# Patient Record
Sex: FEMALE | Race: WHITE | NOT HISPANIC OR LATINO | Employment: OTHER | URBAN - METROPOLITAN AREA
[De-identification: names, ages, dates, MRNs, and addresses within clinical notes are randomized per-mention and may not be internally consistent; named-entity substitution may affect disease eponyms.]

---

## 2018-08-13 ENCOUNTER — HOSPITAL ENCOUNTER (OUTPATIENT)
Facility: MEDICAL CENTER | Age: 78
End: 2018-08-13
Attending: NEUROLOGICAL SURGERY | Admitting: NEUROLOGICAL SURGERY
Payer: MEDICARE

## 2018-08-15 ENCOUNTER — HOSPITAL ENCOUNTER (OUTPATIENT)
Dept: RADIOLOGY | Facility: MEDICAL CENTER | Age: 78
End: 2018-08-15
Attending: NEUROLOGICAL SURGERY | Admitting: NEUROLOGICAL SURGERY
Payer: MEDICARE

## 2018-08-15 VITALS — HEIGHT: 66 IN | BODY MASS INDEX: 21.65 KG/M2 | WEIGHT: 134.7 LBS

## 2018-08-15 DIAGNOSIS — M48.02 SPINAL STENOSIS IN CERVICAL REGION: ICD-10-CM

## 2018-08-15 DIAGNOSIS — Z01.812 PRE-OPERATIVE LABORATORY EXAMINATION: ICD-10-CM

## 2018-08-15 DIAGNOSIS — M50.30 DEGENERATION OF CERVICAL INTERVERTEBRAL DISC: ICD-10-CM

## 2018-08-15 DIAGNOSIS — Z01.810 PRE-OPERATIVE CARDIOVASCULAR EXAMINATION: ICD-10-CM

## 2018-08-15 LAB
ANION GAP SERPL CALC-SCNC: 12 MMOL/L (ref 0–11.9)
APPEARANCE UR: CLEAR
APTT PPP: 26.6 SEC (ref 24.7–36)
BASOPHILS # BLD AUTO: 0.4 % (ref 0–1.8)
BASOPHILS # BLD: 0.04 K/UL (ref 0–0.12)
BILIRUB UR QL STRIP.AUTO: NEGATIVE
BUN SERPL-MCNC: 13 MG/DL (ref 8–22)
CALCIUM SERPL-MCNC: 9.5 MG/DL (ref 8.5–10.5)
CHLORIDE SERPL-SCNC: 104 MMOL/L (ref 96–112)
CO2 SERPL-SCNC: 26 MMOL/L (ref 20–33)
COLOR UR: YELLOW
CREAT SERPL-MCNC: 0.73 MG/DL (ref 0.5–1.4)
EKG IMPRESSION: NORMAL
EOSINOPHIL # BLD AUTO: 0.12 K/UL (ref 0–0.51)
EOSINOPHIL NFR BLD: 1.1 % (ref 0–6.9)
ERYTHROCYTE [DISTWIDTH] IN BLOOD BY AUTOMATED COUNT: 54 FL (ref 35.9–50)
GLUCOSE SERPL-MCNC: 82 MG/DL (ref 65–99)
GLUCOSE UR STRIP.AUTO-MCNC: NEGATIVE MG/DL
HCT VFR BLD AUTO: 43.6 % (ref 37–47)
HGB BLD-MCNC: 13.8 G/DL (ref 12–16)
IMM GRANULOCYTES # BLD AUTO: 0.04 K/UL (ref 0–0.11)
IMM GRANULOCYTES NFR BLD AUTO: 0.4 % (ref 0–0.9)
INR PPP: 0.93 (ref 0.87–1.13)
KETONES UR STRIP.AUTO-MCNC: 40 MG/DL
LEUKOCYTE ESTERASE UR QL STRIP.AUTO: NEGATIVE
LYMPHOCYTES # BLD AUTO: 2.05 K/UL (ref 1–4.8)
LYMPHOCYTES NFR BLD: 19.1 % (ref 22–41)
MCH RBC QN AUTO: 32.6 PG (ref 27–33)
MCHC RBC AUTO-ENTMCNC: 31.7 G/DL (ref 33.6–35)
MCV RBC AUTO: 103.1 FL (ref 81.4–97.8)
MICRO URNS: ABNORMAL
MONOCYTES # BLD AUTO: 0.66 K/UL (ref 0–0.85)
MONOCYTES NFR BLD AUTO: 6.1 % (ref 0–13.4)
NEUTROPHILS # BLD AUTO: 7.84 K/UL (ref 2–7.15)
NEUTROPHILS NFR BLD: 72.9 % (ref 44–72)
NITRITE UR QL STRIP.AUTO: NEGATIVE
NRBC # BLD AUTO: 0 K/UL
NRBC BLD-RTO: 0 /100 WBC
PH UR STRIP.AUTO: 7.5 [PH]
PLATELET # BLD AUTO: 254 K/UL (ref 164–446)
PMV BLD AUTO: 11.2 FL (ref 9–12.9)
POTASSIUM SERPL-SCNC: 4.2 MMOL/L (ref 3.6–5.5)
PROT UR QL STRIP: NEGATIVE MG/DL
PROTHROMBIN TIME: 12.2 SEC (ref 12–14.6)
RBC # BLD AUTO: 4.23 M/UL (ref 4.2–5.4)
RBC UR QL AUTO: NEGATIVE
SODIUM SERPL-SCNC: 142 MMOL/L (ref 135–145)
SP GR UR STRIP.AUTO: 1.02
UROBILINOGEN UR STRIP.AUTO-MCNC: 1 MG/DL
WBC # BLD AUTO: 10.8 K/UL (ref 4.8–10.8)

## 2018-08-15 PROCEDURE — 72050 X-RAY EXAM NECK SPINE 4/5VWS: CPT

## 2018-08-15 PROCEDURE — 36415 COLL VENOUS BLD VENIPUNCTURE: CPT

## 2018-08-15 PROCEDURE — 71046 X-RAY EXAM CHEST 2 VIEWS: CPT

## 2018-08-15 PROCEDURE — 85025 COMPLETE CBC W/AUTO DIFF WBC: CPT

## 2018-08-15 PROCEDURE — 93010 ELECTROCARDIOGRAM REPORT: CPT | Performed by: INTERNAL MEDICINE

## 2018-08-15 PROCEDURE — 85610 PROTHROMBIN TIME: CPT

## 2018-08-15 PROCEDURE — 80048 BASIC METABOLIC PNL TOTAL CA: CPT

## 2018-08-15 PROCEDURE — 85730 THROMBOPLASTIN TIME PARTIAL: CPT

## 2018-08-15 PROCEDURE — 93005 ELECTROCARDIOGRAM TRACING: CPT

## 2018-08-15 PROCEDURE — 81003 URINALYSIS AUTO W/O SCOPE: CPT

## 2018-08-15 RX ORDER — HYDROCODONE BITARTRATE AND ACETAMINOPHEN 7.5; 325 MG/1; MG/1
1-2 TABLET ORAL EVERY 8 HOURS PRN
COMMUNITY
End: 2018-08-17 | Stop reason: HOSPADM

## 2023-11-29 ENCOUNTER — APPOINTMENT (OUTPATIENT)
Dept: RADIOLOGY | Facility: MEDICAL CENTER | Age: 83
DRG: 189 | End: 2023-11-29
Payer: OTHER MISCELLANEOUS

## 2023-11-29 ENCOUNTER — HOSPITAL ENCOUNTER (INPATIENT)
Facility: MEDICAL CENTER | Age: 83
LOS: 7 days | DRG: 189 | End: 2023-12-06
Attending: EMERGENCY MEDICINE | Admitting: INTERNAL MEDICINE
Payer: OTHER MISCELLANEOUS

## 2023-11-29 ENCOUNTER — APPOINTMENT (OUTPATIENT)
Dept: RADIOLOGY | Facility: MEDICAL CENTER | Age: 83
DRG: 189 | End: 2023-11-29
Attending: EMERGENCY MEDICINE
Payer: OTHER MISCELLANEOUS

## 2023-11-29 DIAGNOSIS — S20.219A CONTUSION OF CHEST WALL, UNSPECIFIED LATERALITY, INITIAL ENCOUNTER: ICD-10-CM

## 2023-11-29 DIAGNOSIS — R09.02 HYPOXIA: ICD-10-CM
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
## 2023-11-29 DIAGNOSIS — J96.21 ACUTE ON CHRONIC RESPIRATORY FAILURE WITH HYPOXIA (HCC): ICD-10-CM

## 2023-11-29 DIAGNOSIS — J18.9 PNEUMONIA DUE TO INFECTIOUS ORGANISM, UNSPECIFIED LATERALITY, UNSPECIFIED PART OF LUNG: ICD-10-CM

## 2023-11-29 PROBLEM — J96.20 ACUTE ON CHRONIC RESPIRATORY FAILURE (HCC): Status: ACTIVE | Noted: 2023-11-29

## 2023-11-29 PROBLEM — J44.9 COPD (CHRONIC OBSTRUCTIVE PULMONARY DISEASE) (HCC): Status: ACTIVE | Noted: 2023-11-29

## 2023-11-29 PROBLEM — D72.829 LEUKOCYTOSIS: Status: ACTIVE | Noted: 2023-11-29

## 2023-11-29 PROBLEM — Z66 DNR (DO NOT RESUSCITATE): Status: ACTIVE | Noted: 2023-11-29

## 2023-11-29 PROBLEM — Z72.0 TOBACCO ABUSE: Status: ACTIVE | Noted: 2023-11-29

## 2023-11-29 PROBLEM — J15.9 COMMUNITY ACQUIRED BACTERIAL PNEUMONIA: Status: ACTIVE | Noted: 2023-11-29

## 2023-11-29 LAB
ABO + RH BLD: NORMAL
ABO GROUP BLD: NORMAL
ALBUMIN SERPL BCP-MCNC: 3.6 G/DL (ref 3.2–4.9)
ALBUMIN/GLOB SERPL: 1.2 G/DL
ALP SERPL-CCNC: 88 U/L (ref 30–99)
ALT SERPL-CCNC: 13 U/L (ref 2–50)
ANION GAP SERPL CALC-SCNC: 8 MMOL/L (ref 7–16)
APTT PPP: 30.3 SEC (ref 24.7–36)
AST SERPL-CCNC: 17 U/L (ref 12–45)
BASOPHILS # BLD AUTO: 0.4 % (ref 0–1.8)
BASOPHILS # BLD: 0.04 K/UL (ref 0–0.12)
BILIRUB SERPL-MCNC: 0.2 MG/DL (ref 0.1–1.5)
BLD GP AB SCN SERPL QL: NORMAL
BUN SERPL-MCNC: 17 MG/DL (ref 8–22)
CALCIUM ALBUM COR SERPL-MCNC: 8.9 MG/DL (ref 8.5–10.5)
CALCIUM SERPL-MCNC: 8.6 MG/DL (ref 8.5–10.5)
CHLORIDE SERPL-SCNC: 104 MMOL/L (ref 96–112)
CO2 SERPL-SCNC: 27 MMOL/L (ref 20–33)
CREAT SERPL-MCNC: 0.79 MG/DL (ref 0.5–1.4)
EOSINOPHIL # BLD AUTO: 0.12 K/UL (ref 0–0.51)
EOSINOPHIL NFR BLD: 1.1 % (ref 0–6.9)
ERYTHROCYTE [DISTWIDTH] IN BLOOD BY AUTOMATED COUNT: 52.4 FL (ref 35.9–50)
ETHANOL BLD-MCNC: <10.1 MG/DL
FLUAV RNA SPEC QL NAA+PROBE: NEGATIVE
FLUBV RNA SPEC QL NAA+PROBE: NEGATIVE
GFR SERPLBLD CREATININE-BSD FMLA CKD-EPI: 74 ML/MIN/1.73 M 2
GLOBULIN SER CALC-MCNC: 2.9 G/DL (ref 1.9–3.5)
GLUCOSE SERPL-MCNC: 69 MG/DL (ref 65–99)
HCG SERPL QL: NEGATIVE
HCT VFR BLD AUTO: 43.9 % (ref 37–47)
HGB BLD-MCNC: 14.2 G/DL (ref 12–16)
IMM GRANULOCYTES # BLD AUTO: 0.05 K/UL (ref 0–0.11)
IMM GRANULOCYTES NFR BLD AUTO: 0.5 % (ref 0–0.9)
INR PPP: 0.98 (ref 0.87–1.13)
LACTATE SERPL-SCNC: 1 MMOL/L (ref 0.5–2)
LYMPHOCYTES # BLD AUTO: 2.95 K/UL (ref 1–4.8)
LYMPHOCYTES NFR BLD: 27.1 % (ref 22–41)
MCH RBC QN AUTO: 31.6 PG (ref 27–33)
MCHC RBC AUTO-ENTMCNC: 32.3 G/DL (ref 32.2–35.5)
MCV RBC AUTO: 97.8 FL (ref 81.4–97.8)
MONOCYTES # BLD AUTO: 0.7 K/UL (ref 0–0.85)
MONOCYTES NFR BLD AUTO: 6.4 % (ref 0–13.4)
NEUTROPHILS # BLD AUTO: 7.01 K/UL (ref 1.82–7.42)
NEUTROPHILS NFR BLD: 64.5 % (ref 44–72)
NRBC # BLD AUTO: 0 K/UL
NRBC BLD-RTO: 0 /100 WBC (ref 0–0.2)
PLATELET # BLD AUTO: 205 K/UL (ref 164–446)
PMV BLD AUTO: 11.5 FL (ref 9–12.9)
POTASSIUM SERPL-SCNC: 4.8 MMOL/L (ref 3.6–5.5)
PROT SERPL-MCNC: 6.5 G/DL (ref 6–8.2)
PROTHROMBIN TIME: 13.1 SEC (ref 12–14.6)
RBC # BLD AUTO: 4.49 M/UL (ref 4.2–5.4)
RH BLD: NORMAL
RSV RNA SPEC QL NAA+PROBE: NEGATIVE
SARS-COV-2 RNA RESP QL NAA+PROBE: NOTDETECTED
SODIUM SERPL-SCNC: 139 MMOL/L (ref 135–145)
SPECIMEN SOURCE: NORMAL
WBC # BLD AUTO: 10.9 K/UL (ref 4.8–10.8)

## 2023-11-29 PROCEDURE — 700111 HCHG RX REV CODE 636 W/ 250 OVERRIDE (IP): Mod: JZ | Performed by: EMERGENCY MEDICINE

## 2023-11-29 PROCEDURE — 0241U HCHG SARS-COV-2 COVID-19 NFCT DS RESP RNA 4 TRGT MIC: CPT

## 2023-11-29 PROCEDURE — 72131 CT LUMBAR SPINE W/O DYE: CPT

## 2023-11-29 PROCEDURE — 72125 CT NECK SPINE W/O DYE: CPT

## 2023-11-29 PROCEDURE — 96365 THER/PROPH/DIAG IV INF INIT: CPT

## 2023-11-29 PROCEDURE — A9270 NON-COVERED ITEM OR SERVICE: HCPCS | Performed by: INTERNAL MEDICINE

## 2023-11-29 PROCEDURE — 305948 HCHG GREEN TRAUMA ACT PRE-NOTIFY NO CC

## 2023-11-29 PROCEDURE — 99222 1ST HOSP IP/OBS MODERATE 55: CPT | Performed by: SURGERY

## 2023-11-29 PROCEDURE — 96375 TX/PRO/DX INJ NEW DRUG ADDON: CPT

## 2023-11-29 PROCEDURE — 84703 CHORIONIC GONADOTROPIN ASSAY: CPT

## 2023-11-29 PROCEDURE — 71260 CT THORAX DX C+: CPT

## 2023-11-29 PROCEDURE — 99223 1ST HOSP IP/OBS HIGH 75: CPT | Mod: 25,AI | Performed by: INTERNAL MEDICINE

## 2023-11-29 PROCEDURE — 73590 X-RAY EXAM OF LOWER LEG: CPT | Mod: LT

## 2023-11-29 PROCEDURE — 36415 COLL VENOUS BLD VENIPUNCTURE: CPT

## 2023-11-29 PROCEDURE — 85025 COMPLETE CBC W/AUTO DIFF WBC: CPT

## 2023-11-29 PROCEDURE — 700102 HCHG RX REV CODE 250 W/ 637 OVERRIDE(OP): Performed by: INTERNAL MEDICINE

## 2023-11-29 PROCEDURE — 83605 ASSAY OF LACTIC ACID: CPT

## 2023-11-29 PROCEDURE — 700101 HCHG RX REV CODE 250: Performed by: SURGERY

## 2023-11-29 PROCEDURE — 86850 RBC ANTIBODY SCREEN: CPT

## 2023-11-29 PROCEDURE — 85730 THROMBOPLASTIN TIME PARTIAL: CPT

## 2023-11-29 PROCEDURE — 85610 PROTHROMBIN TIME: CPT

## 2023-11-29 PROCEDURE — A9270 NON-COVERED ITEM OR SERVICE: HCPCS | Performed by: SURGERY

## 2023-11-29 PROCEDURE — 700117 HCHG RX CONTRAST REV CODE 255: Performed by: EMERGENCY MEDICINE

## 2023-11-29 PROCEDURE — 86901 BLOOD TYPING SEROLOGIC RH(D): CPT

## 2023-11-29 PROCEDURE — 700105 HCHG RX REV CODE 258: Performed by: INTERNAL MEDICINE

## 2023-11-29 PROCEDURE — 72128 CT CHEST SPINE W/O DYE: CPT

## 2023-11-29 PROCEDURE — 71045 X-RAY EXAM CHEST 1 VIEW: CPT

## 2023-11-29 PROCEDURE — 86900 BLOOD TYPING SEROLOGIC ABO: CPT

## 2023-11-29 PROCEDURE — 99406 BEHAV CHNG SMOKING 3-10 MIN: CPT | Performed by: INTERNAL MEDICINE

## 2023-11-29 PROCEDURE — C9803 HOPD COVID-19 SPEC COLLECT: HCPCS | Performed by: INTERNAL MEDICINE

## 2023-11-29 PROCEDURE — 700111 HCHG RX REV CODE 636 W/ 250 OVERRIDE (IP): Performed by: INTERNAL MEDICINE

## 2023-11-29 PROCEDURE — 99406 BEHAV CHNG SMOKING 3-10 MIN: CPT

## 2023-11-29 PROCEDURE — 70450 CT HEAD/BRAIN W/O DYE: CPT

## 2023-11-29 PROCEDURE — 99285 EMERGENCY DEPT VISIT HI MDM: CPT

## 2023-11-29 PROCEDURE — 80053 COMPREHEN METABOLIC PANEL: CPT

## 2023-11-29 PROCEDURE — 770006 HCHG ROOM/CARE - MED/SURG/GYN SEMI*

## 2023-11-29 PROCEDURE — 700102 HCHG RX REV CODE 250 W/ 637 OVERRIDE(OP): Performed by: EMERGENCY MEDICINE

## 2023-11-29 PROCEDURE — 87040 BLOOD CULTURE FOR BACTERIA: CPT | Mod: 91

## 2023-11-29 PROCEDURE — 700102 HCHG RX REV CODE 250 W/ 637 OVERRIDE(OP): Performed by: SURGERY

## 2023-11-29 PROCEDURE — 82077 ASSAY SPEC XCP UR&BREATH IA: CPT

## 2023-11-29 PROCEDURE — A9270 NON-COVERED ITEM OR SERVICE: HCPCS | Performed by: EMERGENCY MEDICINE

## 2023-11-29 PROCEDURE — 72170 X-RAY EXAM OF PELVIS: CPT

## 2023-11-29 PROCEDURE — 700105 HCHG RX REV CODE 258: Performed by: EMERGENCY MEDICINE

## 2023-11-29 RX ORDER — ZOLPIDEM TARTRATE 5 MG/1
5 TABLET ORAL NIGHTLY PRN
Status: DISCONTINUED | OUTPATIENT
Start: 2023-11-29 | End: 2023-12-06 | Stop reason: HOSPADM

## 2023-11-29 RX ORDER — CELECOXIB 100 MG/1
100 CAPSULE ORAL DAILY
Status: DISCONTINUED | OUTPATIENT
Start: 2023-11-29 | End: 2023-11-29

## 2023-11-29 RX ORDER — AZITHROMYCIN 250 MG/1
500 TABLET, FILM COATED ORAL ONCE
Status: COMPLETED | OUTPATIENT
Start: 2023-11-29 | End: 2023-11-29

## 2023-11-29 RX ORDER — HYDROCODONE BITARTRATE AND ACETAMINOPHEN 5; 325 MG/1; MG/1
1 TABLET ORAL EVERY 4 HOURS PRN
Status: ON HOLD | COMMUNITY
End: 2023-12-01

## 2023-11-29 RX ORDER — PREGABALIN 100 MG/1
100 CAPSULE ORAL EVERY 12 HOURS
Status: DISCONTINUED | OUTPATIENT
Start: 2023-11-29 | End: 2023-12-06 | Stop reason: HOSPADM

## 2023-11-29 RX ORDER — OXYCODONE HYDROCHLORIDE 5 MG/1
5 TABLET ORAL
Status: DISCONTINUED | OUTPATIENT
Start: 2023-11-29 | End: 2023-12-06 | Stop reason: HOSPADM

## 2023-11-29 RX ORDER — CELECOXIB 100 MG/1
100 CAPSULE ORAL DAILY
Status: COMPLETED | OUTPATIENT
Start: 2023-11-29 | End: 2023-12-05

## 2023-11-29 RX ORDER — HYDROMORPHONE HYDROCHLORIDE 1 MG/ML
0.5 INJECTION, SOLUTION INTRAMUSCULAR; INTRAVENOUS; SUBCUTANEOUS
Status: DISCONTINUED | OUTPATIENT
Start: 2023-11-29 | End: 2023-12-06 | Stop reason: HOSPADM

## 2023-11-29 RX ORDER — ACETAMINOPHEN 325 MG/1
650 TABLET ORAL EVERY 6 HOURS PRN
Status: DISCONTINUED | OUTPATIENT
Start: 2023-11-29 | End: 2023-11-29

## 2023-11-29 RX ORDER — ENOXAPARIN SODIUM 100 MG/ML
40 INJECTION SUBCUTANEOUS DAILY
Status: DISCONTINUED | OUTPATIENT
Start: 2023-11-29 | End: 2023-12-06 | Stop reason: HOSPADM

## 2023-11-29 RX ORDER — IPRATROPIUM BROMIDE AND ALBUTEROL SULFATE 2.5; .5 MG/3ML; MG/3ML
3 SOLUTION RESPIRATORY (INHALATION)
Status: DISCONTINUED | OUTPATIENT
Start: 2023-11-29 | End: 2023-12-06 | Stop reason: HOSPADM

## 2023-11-29 RX ORDER — LEVOTHYROXINE SODIUM 0.05 MG/1
50 TABLET ORAL
COMMUNITY

## 2023-11-29 RX ORDER — OXYCODONE HYDROCHLORIDE 10 MG/1
10 TABLET ORAL
Status: DISCONTINUED | OUTPATIENT
Start: 2023-11-29 | End: 2023-12-06 | Stop reason: HOSPADM

## 2023-11-29 RX ORDER — SODIUM CHLORIDE 9 MG/ML
INJECTION, SOLUTION INTRAVENOUS CONTINUOUS
Status: DISCONTINUED | OUTPATIENT
Start: 2023-11-29 | End: 2023-11-30

## 2023-11-29 RX ORDER — BISACODYL 10 MG
10 SUPPOSITORY, RECTAL RECTAL
Status: DISCONTINUED | OUTPATIENT
Start: 2023-11-29 | End: 2023-12-06 | Stop reason: HOSPADM

## 2023-11-29 RX ORDER — ZOLPIDEM TARTRATE 6.25 MG/1
6.25 TABLET, FILM COATED, EXTENDED RELEASE ORAL NIGHTLY PRN
Status: DISCONTINUED | OUTPATIENT
Start: 2023-11-29 | End: 2023-11-29

## 2023-11-29 RX ORDER — ACETAMINOPHEN 325 MG/1
650 TABLET ORAL 4 TIMES DAILY
Status: DISPENSED | OUTPATIENT
Start: 2023-11-29 | End: 2023-12-06

## 2023-11-29 RX ORDER — CYCLOBENZAPRINE HCL 10 MG
5 TABLET ORAL 2 TIMES DAILY PRN
Status: DISCONTINUED | OUTPATIENT
Start: 2023-11-29 | End: 2023-12-06 | Stop reason: HOSPADM

## 2023-11-29 RX ORDER — ESTRADIOL 1 MG/1
0.5 TABLET ORAL DAILY
Status: DISCONTINUED | OUTPATIENT
Start: 2023-11-30 | End: 2023-12-06 | Stop reason: HOSPADM

## 2023-11-29 RX ORDER — POLYETHYLENE GLYCOL 3350 17 G/17G
1 POWDER, FOR SOLUTION ORAL
Status: DISCONTINUED | OUTPATIENT
Start: 2023-11-29 | End: 2023-12-06 | Stop reason: HOSPADM

## 2023-11-29 RX ORDER — HYDROCODONE BITARTRATE AND ACETAMINOPHEN 5; 325 MG/1; MG/1
1 TABLET ORAL ONCE
Status: COMPLETED | OUTPATIENT
Start: 2023-11-29 | End: 2023-11-29

## 2023-11-29 RX ORDER — PREGABALIN 100 MG/1
100 CAPSULE ORAL 2 TIMES DAILY
COMMUNITY

## 2023-11-29 RX ORDER — FLUTICASONE FUROATE, UMECLIDINIUM BROMIDE AND VILANTEROL TRIFENATATE 100; 62.5; 25 UG/1; UG/1; UG/1
1 POWDER RESPIRATORY (INHALATION) DAILY
COMMUNITY

## 2023-11-29 RX ORDER — HYDRALAZINE HYDROCHLORIDE 20 MG/ML
10 INJECTION INTRAMUSCULAR; INTRAVENOUS EVERY 4 HOURS PRN
Status: DISCONTINUED | OUTPATIENT
Start: 2023-11-29 | End: 2023-12-06 | Stop reason: HOSPADM

## 2023-11-29 RX ORDER — LIDOCAINE 4 G/G
1 PATCH TOPICAL EVERY 24 HOURS
Status: DISCONTINUED | OUTPATIENT
Start: 2023-11-29 | End: 2023-12-06 | Stop reason: HOSPADM

## 2023-11-29 RX ORDER — LEVOTHYROXINE SODIUM 0.05 MG/1
50 TABLET ORAL
Status: DISCONTINUED | OUTPATIENT
Start: 2023-11-30 | End: 2023-12-06 | Stop reason: HOSPADM

## 2023-11-29 RX ORDER — CYCLOBENZAPRINE HCL 5 MG
5 TABLET ORAL 2 TIMES DAILY PRN
Status: ON HOLD | COMMUNITY
End: 2023-12-01

## 2023-11-29 RX ORDER — AMOXICILLIN 250 MG
2 CAPSULE ORAL 2 TIMES DAILY
Status: DISCONTINUED | OUTPATIENT
Start: 2023-11-29 | End: 2023-12-06 | Stop reason: HOSPADM

## 2023-11-29 RX ORDER — AZITHROMYCIN 250 MG/1
500 TABLET, FILM COATED ORAL DAILY
Status: DISCONTINUED | OUTPATIENT
Start: 2023-11-30 | End: 2023-11-30

## 2023-11-29 RX ORDER — ESTRADIOL 1 MG/1
0.5 TABLET ORAL DAILY
COMMUNITY

## 2023-11-29 RX ORDER — ZOLPIDEM TARTRATE 6.25 MG/1
6.25 TABLET, FILM COATED, EXTENDED RELEASE ORAL NIGHTLY PRN
COMMUNITY

## 2023-11-29 RX ADMIN — PREGABALIN 100 MG: 100 CAPSULE ORAL at 20:05

## 2023-11-29 RX ADMIN — HYDROMORPHONE HYDROCHLORIDE 0.5 MG: 1 INJECTION, SOLUTION INTRAMUSCULAR; INTRAVENOUS; SUBCUTANEOUS at 16:55

## 2023-11-29 RX ADMIN — DOCUSATE SODIUM 50 MG AND SENNOSIDES 8.6 MG 2 TABLET: 8.6; 5 TABLET, FILM COATED ORAL at 20:05

## 2023-11-29 RX ADMIN — AZITHROMYCIN DIHYDRATE 500 MG: 250 TABLET, FILM COATED ORAL at 14:45

## 2023-11-29 RX ADMIN — OXYCODONE HYDROCHLORIDE 10 MG: 10 TABLET ORAL at 21:49

## 2023-11-29 RX ADMIN — CELECOXIB 100 MG: 100 CAPSULE ORAL at 16:55

## 2023-11-29 RX ADMIN — ACETAMINOPHEN 650 MG: 325 TABLET, FILM COATED ORAL at 16:55

## 2023-11-29 RX ADMIN — SODIUM CHLORIDE: 9 INJECTION, SOLUTION INTRAVENOUS at 19:57

## 2023-11-29 RX ADMIN — HYDROCODONE BITARTRATE AND ACETAMINOPHEN 1 TABLET: 5; 325 TABLET ORAL at 14:44

## 2023-11-29 RX ADMIN — SODIUM CHLORIDE: 9 INJECTION, SOLUTION INTRAVENOUS at 16:56

## 2023-11-29 RX ADMIN — ACETAMINOPHEN 650 MG: 325 TABLET, FILM COATED ORAL at 20:05

## 2023-11-29 RX ADMIN — AMPICILLIN AND SULBACTAM 3 G: 1; 2 INJECTION, POWDER, FOR SOLUTION INTRAMUSCULAR; INTRAVENOUS at 20:09

## 2023-11-29 RX ADMIN — ENOXAPARIN SODIUM 40 MG: 100 INJECTION SUBCUTANEOUS at 20:05

## 2023-11-29 RX ADMIN — IOHEXOL 100 ML: 350 INJECTION, SOLUTION INTRAVENOUS at 13:15

## 2023-11-29 RX ADMIN — ZOLPIDEM TARTRATE 5 MG: 5 TABLET ORAL at 22:35

## 2023-11-29 RX ADMIN — LIDOCAINE 1 PATCH: 4 PATCH TOPICAL at 16:54

## 2023-11-29 RX ADMIN — AMPICILLIN AND SULBACTAM 3 G: 1; 2 INJECTION, POWDER, FOR SOLUTION INTRAMUSCULAR; INTRAVENOUS at 14:45

## 2023-11-29 ASSESSMENT — COPD QUESTIONNAIRES
DO YOU EVER COUGH UP ANY MUCUS OR PHLEGM?: YES, A FEW DAYS A WEEK OR MONTH
COPD SCREENING SCORE: 7
HAVE YOU SMOKED AT LEAST 100 CIGARETTES IN YOUR ENTIRE LIFE: YES
DURING THE PAST 4 WEEKS HOW MUCH DID YOU FEEL SHORT OF BREATH: SOME OF THE TIME

## 2023-11-29 ASSESSMENT — COGNITIVE AND FUNCTIONAL STATUS - GENERAL
SUGGESTED CMS G CODE MODIFIER DAILY ACTIVITY: CJ
DRESSING REGULAR LOWER BODY CLOTHING: A LITTLE
MOBILITY SCORE: 22
STANDING UP FROM CHAIR USING ARMS: A LITTLE
WALKING IN HOSPITAL ROOM: A LITTLE
TOILETING: A LITTLE
DAILY ACTIVITIY SCORE: 21
SUGGESTED CMS G CODE MODIFIER MOBILITY: CJ
DRESSING REGULAR UPPER BODY CLOTHING: A LITTLE

## 2023-11-29 ASSESSMENT — ENCOUNTER SYMPTOMS
SHORTNESS OF BREATH: 1
TREMORS: 0
PHOTOPHOBIA: 0
CHILLS: 0
DIZZINESS: 0
DOUBLE VISION: 0
TINGLING: 0
PALPITATIONS: 0
WEIGHT LOSS: 0
FOCAL WEAKNESS: 0
MYALGIAS: 1
SENSORY CHANGE: 0
CONSTIPATION: 0
ABDOMINAL PAIN: 0
ORTHOPNEA: 0
EYE PAIN: 0
BLURRED VISION: 0
NAUSEA: 0
NECK PAIN: 1
FEVER: 0
HEMOPTYSIS: 0
HEADACHES: 0
HALLUCINATIONS: 0
COUGH: 1
SPUTUM PRODUCTION: 1
BACK PAIN: 1
SPEECH CHANGE: 0
VOMITING: 0
DIARRHEA: 0

## 2023-11-29 ASSESSMENT — PATIENT HEALTH QUESTIONNAIRE - PHQ9
3. TROUBLE FALLING OR STAYING ASLEEP OR SLEEPING TOO MUCH: NOT AT ALL
8. MOVING OR SPEAKING SO SLOWLY THAT OTHER PEOPLE COULD HAVE NOTICED. OR THE OPPOSITE, BEING SO FIGETY OR RESTLESS THAT YOU HAVE BEEN MOVING AROUND A LOT MORE THAN USUAL: NOT AT ALL
SUM OF ALL RESPONSES TO PHQ9 QUESTIONS 1 AND 2: 4
5. POOR APPETITE OR OVEREATING: NOT AT ALL
2. FEELING DOWN, DEPRESSED, IRRITABLE, OR HOPELESS: MORE THAN HALF THE DAYS
3. TROUBLE FALLING OR STAYING ASLEEP OR SLEEPING TOO MUCH: NOT AT ALL
4. FEELING TIRED OR HAVING LITTLE ENERGY: MORE THAN HALF THE DAYS
SUM OF ALL RESPONSES TO PHQ QUESTIONS 1-9: 8
9. THOUGHTS THAT YOU WOULD BE BETTER OFF DEAD, OR OF HURTING YOURSELF: NOT AT ALL
7. TROUBLE CONCENTRATING ON THINGS, SUCH AS READING THE NEWSPAPER OR WATCHING TELEVISION: NOT AT ALL
SUM OF ALL RESPONSES TO PHQ9 QUESTIONS 1 AND 2: 4
6. FEELING BAD ABOUT YOURSELF - OR THAT YOU ARE A FAILURE OR HAVE LET YOURSELF OR YOUR FAMILY DOWN: MORE THAN HALF THE DAYS
4. FEELING TIRED OR HAVING LITTLE ENERGY: MORE THAN HALF THE DAYS
2. FEELING DOWN, DEPRESSED, IRRITABLE, OR HOPELESS: MORE THAN HALF THE DAYS
5. POOR APPETITE OR OVEREATING: NOT AT ALL
6. FEELING BAD ABOUT YOURSELF - OR THAT YOU ARE A FAILURE OR HAVE LET YOURSELF OR YOUR FAMILY DOWN: MORE THAN HALF THE DAYS
8. MOVING OR SPEAKING SO SLOWLY THAT OTHER PEOPLE COULD HAVE NOTICED. OR THE OPPOSITE, BEING SO FIGETY OR RESTLESS THAT YOU HAVE BEEN MOVING AROUND A LOT MORE THAN USUAL: NOT AT ALL
SUM OF ALL RESPONSES TO PHQ QUESTIONS 1-9: 8
7. TROUBLE CONCENTRATING ON THINGS, SUCH AS READING THE NEWSPAPER OR WATCHING TELEVISION: NOT AT ALL
1. LITTLE INTEREST OR PLEASURE IN DOING THINGS: MORE THAN HALF THE DAYS
1. LITTLE INTEREST OR PLEASURE IN DOING THINGS: MORE THAN HALF THE DAYS
9. THOUGHTS THAT YOU WOULD BE BETTER OFF DEAD, OR OF HURTING YOURSELF: NOT AT ALL

## 2023-11-29 ASSESSMENT — PAIN DESCRIPTION - PAIN TYPE
TYPE: ACUTE PAIN
TYPE: ACUTE PAIN

## 2023-11-29 ASSESSMENT — LIFESTYLE VARIABLES
AVERAGE NUMBER OF DAYS PER WEEK YOU HAVE A DRINK CONTAINING ALCOHOL: 0
EVER HAD A DRINK FIRST THING IN THE MORNING TO STEADY YOUR NERVES TO GET RID OF A HANGOVER: NO
HAVE PEOPLE ANNOYED YOU BY CRITICIZING YOUR DRINKING: NO
SUBSTANCE_ABUSE: 0
ALCOHOL_USE: NO
HOW MANY TIMES IN THE PAST YEAR HAVE YOU HAD 5 OR MORE DRINKS IN A DAY: 0
TOTAL SCORE: 0
TOTAL SCORE: 0
CONSUMPTION TOTAL: NEGATIVE
EVER FELT BAD OR GUILTY ABOUT YOUR DRINKING: NO
ON A TYPICAL DAY WHEN YOU DRINK ALCOHOL HOW MANY DRINKS DO YOU HAVE: 0
TOTAL SCORE: 0
DOES PATIENT WANT TO STOP DRINKING: NO
HAVE YOU EVER FELT YOU SHOULD CUT DOWN ON YOUR DRINKING: NO

## 2023-11-29 ASSESSMENT — FIBROSIS 4 INDEX
FIB4 SCORE: 1.91
FIB4 SCORE: 1.91
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
## 2023-11-29 NOTE — ASSESSMENT & PLAN NOTE
12/5/2023  Patient remained extensive imaging studies that did not show any acute abnormalities.  ER physician discussed case with trauma surgery.

## 2023-11-29 NOTE — ASSESSMENT & PLAN NOTE
Patient reported she has been having symptoms of cough for a month.  Chest x-ray showed possible airspace disease.  I started her on broad-spectrum antibiotic with Unasyn and azithromycin.  Ordered culture results.  I ordered COVID and flu testing  I ordered procalcitonin level.    Procal neg.   Viral neg.

## 2023-11-29 NOTE — ED NOTES
Med rec is complete per Patient at bedside and [phone]    Allergies reviewed.    Has patient had any outside antibiotics in the last 30 days? N    Any Anticoagulants (rivaroxaban, apixaban, edoxaban, dabigatran, warfarin, enoxaparin) taken in the last 14 days? N           Pharmacy/Pharmacies Pt utilizes : [facility] [phone]

## 2023-11-29 NOTE — ASSESSMENT & PLAN NOTE
12/5/2023  Patient has history of COPD but she does not show signs of acute exacerbation.  Continue outpatient inhalers.  Continue provider oxygen.    Monitoring.
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
## 2023-11-29 NOTE — ASSESSMENT & PLAN NOTE
12/5/2023  Patient is a current smoker and she reported she has history of COPD  She uses 2 L of oxygen at home.  In ER on 2 L of oxygen patient was hypoxic.  Continue to provide oxygen and titrate down as tolerated.  Respiratory failure could be multifactorial due to pneumonia and pain and she is taking shallow breaths.    Monitoring.   2L of o2.

## 2023-11-29 NOTE — ASSESSMENT & PLAN NOTE
12/5/2023  Patient reported she smokes half pack a day.  I discussed with her regarding adverse effect of smoking.  After discussion I started on nicotine replacement therapy  Time spent 4 minutes.    Needs to quit.
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
## 2023-11-29 NOTE — ASSESSMENT & PLAN NOTE
I discussed CODE STATUS with patient she would like to be DNR.  As per patient wishes I placed DNR orders.

## 2023-11-30 ENCOUNTER — APPOINTMENT (OUTPATIENT)
Dept: RADIOLOGY | Facility: MEDICAL CENTER | Age: 83
DRG: 189 | End: 2023-11-30
Attending: NURSE PRACTITIONER
Payer: OTHER MISCELLANEOUS

## 2023-11-30 ENCOUNTER — APPOINTMENT (OUTPATIENT)
Dept: RADIOLOGY | Facility: MEDICAL CENTER | Age: 83
DRG: 189 | End: 2023-11-30
Payer: OTHER MISCELLANEOUS

## 2023-11-30 ENCOUNTER — APPOINTMENT (OUTPATIENT)
Dept: RADIOLOGY | Facility: MEDICAL CENTER | Age: 83
DRG: 189 | End: 2023-11-30
Attending: SURGERY
Payer: OTHER MISCELLANEOUS

## 2023-11-30 LAB
ALBUMIN SERPL BCP-MCNC: 2.9 G/DL (ref 3.2–4.9)
ALBUMIN/GLOB SERPL: 1.2 G/DL
ALP SERPL-CCNC: 78 U/L (ref 30–99)
ALT SERPL-CCNC: 10 U/L (ref 2–50)
ANION GAP SERPL CALC-SCNC: 5 MMOL/L (ref 7–16)
AST SERPL-CCNC: 19 U/L (ref 12–45)
BASOPHILS # BLD AUTO: 0.5 % (ref 0–1.8)
BASOPHILS # BLD: 0.04 K/UL (ref 0–0.12)
BILIRUB SERPL-MCNC: 0.5 MG/DL (ref 0.1–1.5)
BUN SERPL-MCNC: 13 MG/DL (ref 8–22)
CALCIUM ALBUM COR SERPL-MCNC: 8.5 MG/DL (ref 8.5–10.5)
CALCIUM SERPL-MCNC: 7.6 MG/DL (ref 8.5–10.5)
CHLORIDE SERPL-SCNC: 108 MMOL/L (ref 96–112)
CO2 SERPL-SCNC: 26 MMOL/L (ref 20–33)
CREAT SERPL-MCNC: 0.75 MG/DL (ref 0.5–1.4)
EKG IMPRESSION: NORMAL
EOSINOPHIL # BLD AUTO: 0.2 K/UL (ref 0–0.51)
EOSINOPHIL NFR BLD: 2.6 % (ref 0–6.9)
ERYTHROCYTE [DISTWIDTH] IN BLOOD BY AUTOMATED COUNT: 51.4 FL (ref 35.9–50)
GFR SERPLBLD CREATININE-BSD FMLA CKD-EPI: 79 ML/MIN/1.73 M 2
GLOBULIN SER CALC-MCNC: 2.5 G/DL (ref 1.9–3.5)
GLUCOSE SERPL-MCNC: 80 MG/DL (ref 65–99)
HCT VFR BLD AUTO: 43.1 % (ref 37–47)
HGB BLD-MCNC: 14 G/DL (ref 12–16)
IMM GRANULOCYTES # BLD AUTO: 0.02 K/UL (ref 0–0.11)
IMM GRANULOCYTES NFR BLD AUTO: 0.3 % (ref 0–0.9)
LYMPHOCYTES # BLD AUTO: 1.37 K/UL (ref 1–4.8)
LYMPHOCYTES NFR BLD: 17.6 % (ref 22–41)
MCH RBC QN AUTO: 31.7 PG (ref 27–33)
MCHC RBC AUTO-ENTMCNC: 32.5 G/DL (ref 32.2–35.5)
MCV RBC AUTO: 97.5 FL (ref 81.4–97.8)
MONOCYTES # BLD AUTO: 0.54 K/UL (ref 0–0.85)
MONOCYTES NFR BLD AUTO: 6.9 % (ref 0–13.4)
NEUTROPHILS # BLD AUTO: 5.63 K/UL (ref 1.82–7.42)
NEUTROPHILS NFR BLD: 72.1 % (ref 44–72)
NRBC # BLD AUTO: 0 K/UL
NRBC BLD-RTO: 0 /100 WBC (ref 0–0.2)
PLATELET # BLD AUTO: 121 K/UL (ref 164–446)
PMV BLD AUTO: 11.9 FL (ref 9–12.9)
POTASSIUM SERPL-SCNC: 4.6 MMOL/L (ref 3.6–5.5)
PROCALCITONIN SERPL-MCNC: 0.13 NG/ML
PROT SERPL-MCNC: 5.4 G/DL (ref 6–8.2)
RBC # BLD AUTO: 4.42 M/UL (ref 4.2–5.4)
SODIUM SERPL-SCNC: 139 MMOL/L (ref 135–145)
WBC # BLD AUTO: 7.8 K/UL (ref 4.8–10.8)

## 2023-11-30 PROCEDURE — 85025 COMPLETE CBC W/AUTO DIFF WBC: CPT

## 2023-11-30 PROCEDURE — 84145 PROCALCITONIN (PCT): CPT

## 2023-11-30 PROCEDURE — 93010 ELECTROCARDIOGRAM REPORT: CPT | Performed by: INTERNAL MEDICINE

## 2023-11-30 PROCEDURE — 73030 X-RAY EXAM OF SHOULDER: CPT | Mod: RT

## 2023-11-30 PROCEDURE — 99232 SBSQ HOSP IP/OBS MODERATE 35: CPT

## 2023-11-30 PROCEDURE — 71250 CT THORAX DX C-: CPT

## 2023-11-30 PROCEDURE — 94664 DEMO&/EVAL PT USE INHALER: CPT

## 2023-11-30 PROCEDURE — 93005 ELECTROCARDIOGRAM TRACING: CPT | Performed by: HOSPITALIST

## 2023-11-30 PROCEDURE — A9270 NON-COVERED ITEM OR SERVICE: HCPCS | Performed by: SURGERY

## 2023-11-30 PROCEDURE — 700101 HCHG RX REV CODE 250: Performed by: SURGERY

## 2023-11-30 PROCEDURE — 99233 SBSQ HOSP IP/OBS HIGH 50: CPT | Performed by: HOSPITALIST

## 2023-11-30 PROCEDURE — 700102 HCHG RX REV CODE 250 W/ 637 OVERRIDE(OP): Performed by: INTERNAL MEDICINE

## 2023-11-30 PROCEDURE — A9270 NON-COVERED ITEM OR SERVICE: HCPCS | Performed by: INTERNAL MEDICINE

## 2023-11-30 PROCEDURE — 80053 COMPREHEN METABOLIC PANEL: CPT

## 2023-11-30 PROCEDURE — 700111 HCHG RX REV CODE 636 W/ 250 OVERRIDE (IP): Performed by: INTERNAL MEDICINE

## 2023-11-30 PROCEDURE — 700102 HCHG RX REV CODE 250 W/ 637 OVERRIDE(OP): Performed by: SURGERY

## 2023-11-30 PROCEDURE — 71045 X-RAY EXAM CHEST 1 VIEW: CPT

## 2023-11-30 PROCEDURE — 770006 HCHG ROOM/CARE - MED/SURG/GYN SEMI*

## 2023-11-30 PROCEDURE — 99406 BEHAV CHNG SMOKING 3-10 MIN: CPT

## 2023-11-30 PROCEDURE — 700105 HCHG RX REV CODE 258: Performed by: INTERNAL MEDICINE

## 2023-11-30 PROCEDURE — 36415 COLL VENOUS BLD VENIPUNCTURE: CPT

## 2023-11-30 RX ADMIN — ACETAMINOPHEN 650 MG: 325 TABLET, FILM COATED ORAL at 17:25

## 2023-11-30 RX ADMIN — OXYCODONE HYDROCHLORIDE 10 MG: 10 TABLET ORAL at 13:23

## 2023-11-30 RX ADMIN — LEVOTHYROXINE SODIUM 50 MCG: 50 TABLET ORAL at 05:24

## 2023-11-30 RX ADMIN — LIDOCAINE 1 PATCH: 4 PATCH TOPICAL at 17:25

## 2023-11-30 RX ADMIN — CELECOXIB 100 MG: 100 CAPSULE ORAL at 05:24

## 2023-11-30 RX ADMIN — ENOXAPARIN SODIUM 40 MG: 100 INJECTION SUBCUTANEOUS at 17:27

## 2023-11-30 RX ADMIN — FLUTICASONE FUROATE, UMECLIDINIUM BROMIDE AND VILANTEROL TRIFENATATE 1 PUFF: 100; 62.5; 25 POWDER RESPIRATORY (INHALATION) at 05:24

## 2023-11-30 RX ADMIN — AMPICILLIN AND SULBACTAM 3 G: 1; 2 INJECTION, POWDER, FOR SOLUTION INTRAMUSCULAR; INTRAVENOUS at 02:37

## 2023-11-30 RX ADMIN — OXYCODONE HYDROCHLORIDE 10 MG: 10 TABLET ORAL at 22:25

## 2023-11-30 RX ADMIN — ACETAMINOPHEN 650 MG: 325 TABLET, FILM COATED ORAL at 09:54

## 2023-11-30 RX ADMIN — AZITHROMYCIN DIHYDRATE 500 MG: 250 TABLET, FILM COATED ORAL at 05:24

## 2023-11-30 RX ADMIN — HYDROMORPHONE HYDROCHLORIDE 0.5 MG: 1 INJECTION, SOLUTION INTRAMUSCULAR; INTRAVENOUS; SUBCUTANEOUS at 18:42

## 2023-11-30 RX ADMIN — AMPICILLIN AND SULBACTAM 3 G: 1; 2 INJECTION, POWDER, FOR SOLUTION INTRAMUSCULAR; INTRAVENOUS at 09:53

## 2023-11-30 RX ADMIN — ESTRADIOL 0.5 MG: 1 TABLET ORAL at 05:24

## 2023-11-30 RX ADMIN — ACETAMINOPHEN 650 MG: 325 TABLET, FILM COATED ORAL at 13:24

## 2023-11-30 RX ADMIN — DOCUSATE SODIUM 50 MG AND SENNOSIDES 8.6 MG 2 TABLET: 8.6; 5 TABLET, FILM COATED ORAL at 17:26

## 2023-11-30 RX ADMIN — OXYCODONE HYDROCHLORIDE 10 MG: 10 TABLET ORAL at 01:50

## 2023-11-30 RX ADMIN — PREGABALIN 100 MG: 100 CAPSULE ORAL at 17:25

## 2023-11-30 RX ADMIN — PREGABALIN 100 MG: 100 CAPSULE ORAL at 05:24

## 2023-11-30 RX ADMIN — ACETAMINOPHEN 650 MG: 325 TABLET, FILM COATED ORAL at 21:00

## 2023-11-30 RX ADMIN — DOCUSATE SODIUM 50 MG AND SENNOSIDES 8.6 MG 2 TABLET: 8.6; 5 TABLET, FILM COATED ORAL at 05:24

## 2023-11-30 ASSESSMENT — ENCOUNTER SYMPTOMS
DOUBLE VISION: 0
DIZZINESS: 0
SHORTNESS OF BREATH: 1
BACK PAIN: 0
BRUISES/BLEEDS EASILY: 0
CHILLS: 0
MYALGIAS: 0
NECK PAIN: 0
HEADACHES: 0
PND: 0
CLAUDICATION: 0
COUGH: 1
HEMOPTYSIS: 0
ABDOMINAL PAIN: 0
BLURRED VISION: 0
WHEEZING: 0
VOMITING: 0
HEARTBURN: 0
PALPITATIONS: 0
FEVER: 0
DEPRESSION: 0
MYALGIAS: 1
NAUSEA: 0
SENSORY CHANGE: 0

## 2023-11-30 ASSESSMENT — PAIN DESCRIPTION - PAIN TYPE
TYPE: ACUTE PAIN

## 2023-11-30 ASSESSMENT — LIFESTYLE VARIABLES: PACK_YEARS: 51

## 2023-11-30 NOTE — PROGRESS NOTES
"      Mental status adequate for full examination?: Yes    Spine cleared (radiologically and/or clinically): Yes    REVIEW OF SYSTEMS:  Review of Systems   Constitutional:  Positive for malaise/fatigue. Negative for chills and fever.   Eyes:  Negative for blurred vision and double vision.   Respiratory:  Positive for shortness of breath.    Cardiovascular:  Negative for chest pain.   Gastrointestinal:  Negative for abdominal pain, nausea and vomiting.   Genitourinary:  Negative for dysuria.   Musculoskeletal:  Positive for myalgias (throughout). Negative for back pain, joint pain and neck pain.   Neurological:  Negative for dizziness, sensory change and headaches.   All other systems reviewed and are negative.      PHYSICAL EXAMINATION:  /44   Pulse 72   Temp 37 °C (98.6 °F) (Temporal)   Resp (P) 16   Ht 1.676 m (5' 6\")   Wt 64 kg (141 lb 1.5 oz)   SpO2 93%   BMI 22.77 kg/m²   Physical Exam  Vitals and nursing note reviewed.   Constitutional:       General: She is not in acute distress.     Appearance: She is ill-appearing.      Interventions: Nasal cannula in place.   HENT:      Head: Normocephalic.      Right Ear: External ear normal.      Left Ear: External ear normal.      Nose: Nose normal.      Mouth/Throat:      Mouth: Mucous membranes are moist.      Pharynx: Oropharynx is clear.   Eyes:      Conjunctiva/sclera: Conjunctivae normal.      Pupils: Pupils are equal, round, and reactive to light.   Cardiovascular:      Rate and Rhythm: Normal rate.      Pulses: Normal pulses.      Heart sounds: Normal heart sounds.   Pulmonary:      Effort: Pulmonary effort is normal. No respiratory distress.      Breath sounds: Decreased breath sounds present.   Chest:      Chest wall: Tenderness (sternum) present.   Abdominal:      General: There is no distension.      Palpations: Abdomen is soft.      Tenderness: There is no abdominal tenderness. There is no guarding.   Musculoskeletal:         General: " Tenderness present. No swelling or deformity. Normal range of motion.      Cervical back: Normal range of motion. No tenderness.      Comments: Denies tenderness to spine  Denies point tenderness to right clavicle   Skin:     General: Skin is warm and dry.      Capillary Refill: Capillary refill takes 2 to 3 seconds.      Findings: Abrasion and bruising present.   Neurological:      Mental Status: She is alert.      GCS: GCS eye subscore is 4. GCS verbal subscore is 4. GCS motor subscore is 6.      Sensory: Sensation is intact.      Motor: Motor function is intact.   Psychiatric:         Mood and Affect: Mood normal.         Behavior: Behavior normal.         Judgment: Judgment normal.         LABORATORY VALUES:  Recent Labs     11/29/23  1233 11/30/23  0402   WBC 10.9* 7.8   RBC 4.49 4.42   HEMOGLOBIN 14.2 14.0   HEMATOCRIT 43.9 43.1   MCV 97.8 97.5   MCH 31.6 31.7   MCHC 32.3 32.5   RDW 52.4* 51.4*   PLATELETCT 205 121*   MPV 11.5 11.9     Recent Labs     11/29/23  1233 11/30/23  0843   SODIUM 139 139   POTASSIUM 4.8 4.6   CHLORIDE 104 108   CO2 27 26   GLUCOSE 69 80   BUN 17 13   CREATININE 0.79 0.75   CALCIUM 8.6 7.6*     Recent Labs     11/29/23  1233 11/30/23  0843   ASTSGOT 17 19   ALTSGPT 13 10   TBILIRUBIN 0.2 0.5   ALKPHOSPHAT 88 78   GLOBULIN 2.9 2.5   INR 0.98  --      Recent Labs     11/29/23  1233   APTT 30.3   INR 0.98       IMAGING:  DX-SHOULDER 2+ RIGHT   Final Result      1.  Fracture of the distal clavicle with some features suggestive of chronic injury. Are there any prior radiographs for comparison?   2.  Osteopenia   3.  Prior surgical fixation of RIGHT humerus fracture      DX-CHEST-PORTABLE (1 VIEW)   Final Result      Unchanged small left pleural effusion or pleural scarring.      CT-TSPINE W/O PLUS RECONS   Final Result      1.  No acute fracture or dislocation.   2.  Minimal degenerative disc disease.      CT-LSPINE W/O PLUS RECONS   Final Result      1.  Postsurgical and mild degenerative  changes as described above.   2.  There is no acute fracture or dislocation.      CT-CHEST,ABDOMEN,PELVIS WITH   Final Result      1.  Sparse peripheral alveolar opacities in both lungs could be a small areas of contusion in the setting of trauma. Alternatively these could be small areas of evolving pulmonary infarction or pneumonia.   2.  Incompletely imaged but possibly chronic RIGHT distal clavicular fracture, recommend correlation for point tenderness   3.  Old LEFT rib fractures   4.  Atherosclerosis   5.  No evidence of acute pathology in the abdomen or pelvis      CT-CSPINE WITHOUT PLUS RECONS   Final Result      Postsurgical and degenerative changes in the cervical spine as described above. There is no acute fracture or dislocation.         CT-HEAD W/O   Final Result      1.  No acute intracranial abnormality.   2.  Atrophy   3.  White matter changes   4.  Small area of encephalomalacia in the RIGHT cerebellum            DX-TIBIA AND FIBULA LEFT   Final Result      No radiographic evidence of acute traumatic injury.      DX-PELVIS-1 OR 2 VIEWS   Final Result      1.  No acute fracture or dislocation.   2.  Osteopenia.   3.  Mild degenerative changes of the bilateral hip joints.      DX-CHEST-LIMITED (1 VIEW)   Final Result      1.  Small left pleural effusion with adjacent airspace disease.          RAP Score Total: 4      CAGE Results: not completed Blood Alcohol>0.08: no       All current laboratory studies/radiology exams reviewed: Yes      Medications reconciliation has been reviewed: Yes    Completed Consultations:  none     Pending Consultations:  none    Newly identified diagnoses, injuries and/or co-morbidities:  Shoulder xray shows a fracture of the distal clavicle with some features suggestive of chronic injury. During chart review, an outpatient shoulder xray was found. Unfortunately, our film room is unable to receive the image. Discussed with radiologist assessment findings and more likely a  chronic injury. There is no point tenderness to right clavicle on exam.   No further injuries identified.      Discussed patient condition with RN, Patient, and trauma surgery, Dr. Benites.
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
## 2023-11-30 NOTE — CARE PLAN
The patient is Stable - Low risk of patient condition declining or worsening    Shift Goals  Clinical Goals: Pain management  Patient Goals: Pain control  Family Goals: MAMTA    Progress made toward(s) clinical / shift goals:      Patient is not progressing towards the following goals:      Problem: Pain - Standard  Goal: Alleviation of pain or a reduction in pain to the patient’s comfort goal  Outcome: Not Progressing  Patient was medicated for pain as per MAR.

## 2023-11-30 NOTE — PROGRESS NOTES
4 Eyes Skin Assessment Completed by KYA Cardenas and KYA Simons.    Head WDL  Ears Redness and Blanching  Nose WDL  Mouth WDL  Neck WDL  Breast/Chest Redness, Blanching under the breasts  Shoulder Blades WDL  Spine WDL  (R) Arm/Elbow/Hand WDL  (L) Arm/Elbow/Hand WDL  Abdomen WDL  Groin WDL  Scrotum/Coccyx/Buttocks WDL  (R) Leg WDL  (L) Leg Bruising and swelling  (R) Heel/Foot/Toe Redness, Blanching, and Boggy  (L) Heel/Foot/Toe Redness, Blanching, and Boggy          Devices In Places Blood Pressure Cuff and Nasal Cannula with grey earpads foam      Interventions In Place Heel Mepilex    Possible Skin Injury No    Pictures Uploaded Into Epic No, needs to be completed  Wound Consult Placed N/A  RN Wound Prevention Protocol Ordered No    Applied

## 2023-11-30 NOTE — RESPIRATORY CARE
COPD EDUCATION by COPD CLINICAL EDUCATOR  11/30/2023  at  3:57 PM by Radha Calvo RRT     Patient interviewed by COPD education team.  Patient declined to participate in full program.  A short intervention has been conducted.  A comprehensive packet including information about COPD, types of treatments to manage their disease and safe home Oxygen usage was provided and left  with patient at the bedside.        COPD Screen  COPD Risk Screening  Do you have a history of COPD?: Yes  Do you have a Pulmonologist?: No  COPD Population Screener  During the past 4 weeks, how much did you feel short of breath?: Some of the time  Do you ever cough up any mucus or phlegm?: Yes, a few days a week or month  In the past 12 months, you do less than you used to because of your breathing problems: Agree  Have you smoked at least 100 cigarettes in your entire life?: Yes  How old are you?: 60+  COPD Screening Score: 7  COPD Coordinator Recommended: Yes    COPD Assessment  COPD Clinical Specialists ONLY  COPD Education Initiated: Yes--Short Intervention (Pt lives in [facility], she is current smoker with COPD hx per patient. Primary care physician manages her COPD, trelegy no rescue and 2 lpm 24/7)  Is this a COPD exacerbation patient?: No  DME Company: [employer]  DME Equipment Type: Oxygen equipement 2 lpm 24/7  Physician Follow Up Appointment:  (none scheduled)  Physician Name: None at this time  Pulmonary Follow Up Appointment:  (none at this time)  Pulmonologist Name: none at this time, patient not interested in establishing with one.  Referrals Initiated: Yes  Pulmonary Rehab: N/A  Smoking Cessation: Yes  $ Smoking Cessation 3-10 Minutes: Symptomatic (patient not interested in quitting. Declined materials)  Smoking Pack Years: 51  Hospice: N/A  Home Health Care: N/A  Mobile Urgent Care Services: N/A  Geriatric Specialty Group: N/A  Private In-Home Care Agency: N/A  $ Demo/Eval of SVN's, MDI's and Aerosols: Yes (Home mediations  "and oxygen were reviewed. COPD left with patient, she was not interested in reviewing the booklet with me.)  Interdisciplinary Rounds: Attendance at Rounds (30 Min)    PFT Results    No results found for: \"PFT\"    Meds to [facility] provides bedside medication delivery for all eligible patients at discharge.  Would you like to opt out of this program for any reason?: Yes - Opt Out  Reason the patient would like to opt out of Bedside Medication Delivery at Discharge?: Patient prefers another pharmacy     MY COPD ACTION PLAN     It is recommended that patients and physicians /healthcare providers complete this action plan together. This plan should be discussed at each physician visit and updated as needed.    The green, yellow and red zones show groups of symptoms of COPD. This list of symptoms is not comprehensive, and you may experience other symptoms. In the \"Actions\" column, your healthcare provider has recommended actions for you to take based on your symptoms.    Patient Name: Isa Randallac   YOB: 1940   Last Updated on: 11/30/2023  3:56 PM   Green Zone:  I am doing well today Actions     Usual activitiy and exercise level   Take daily medications     Usual amounts of cough and phlegm/mucus   Use oxygen as prescribed     Sleep well at night   Continue regular exercise/diet plan     Appetite is good   At all times avoid cigarette smoke, inhaled irritants     Daily Medications (these medications are taken every day):   Fluticasone and Umeclidinium and Vilanterol (Trelogy) 1 Puff Once daily     Additional Information:  Take as directed, rinse mouth well with water and spit after each use.      Yellow Zone:  I am having a bad day or a COPD flare Actions     More breathless than usual   Continue daily medications     I have less energy for my daily activities   Use quick relief inhaler as ordered     Increased or thicker phlegm/mucus   Use oxygen as prescribed     Using quick relief " "inhaler/nebulizer more often   Get plenty of rest     Swelling of ankles more than usual   Use pursed lip breathing     More coughing than usual   At all times avoid cigarette smoke, inhaled irritants     I feel like I have a \"chest cold\"     Poor sleep and my symptoms woke me up     My appetite is not good     My medicine is not helping      Call provider immediately if symptoms don’t improve     Continue daily medications, add rescue medications:               Medications to be used during a flare up, (as Discussed with Provider):           Additional Information:  No rescue medications at this time.     Red Zone:  I need urgent medical care Actions     Severe shortness of breath even at rest   Call 911 or seek medical care immediately     Not able to do any activity because of breathing      Fever or shaking chills      Feeling confused or very drowsy       Chest pains      Coughing up blood                  "
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
## 2023-11-30 NOTE — PROGRESS NOTES
Received patient who came in on rHumphrey, on 2ltrs oxgen per nasal cannula, patient A & O x 4. Patient was able to mobilize from the door to the hospital bed. Patient was oriented to the room. Call bell and personal belongings are within reach. Bed alarm on. All needs met.

## 2023-12-01 PROBLEM — J96.20 ACUTE ON CHRONIC RESPIRATORY FAILURE (HCC): Status: RESOLVED | Noted: 2023-11-29 | Resolved: 2023-12-01

## 2023-12-01 PROBLEM — Z66 DNR (DO NOT RESUSCITATE): Status: RESOLVED | Noted: 2023-11-29 | Resolved: 2023-12-01
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
## 2023-12-01 PROBLEM — D72.829 LEUKOCYTOSIS: Status: RESOLVED | Noted: 2023-11-29 | Resolved: 2023-12-01

## 2023-12-01 LAB
ANION GAP SERPL CALC-SCNC: 8 MMOL/L (ref 7–16)
BUN SERPL-MCNC: 17 MG/DL (ref 8–22)
CALCIUM SERPL-MCNC: 7.9 MG/DL (ref 8.5–10.5)
CHLORIDE SERPL-SCNC: 109 MMOL/L (ref 96–112)
CO2 SERPL-SCNC: 23 MMOL/L (ref 20–33)
CREAT SERPL-MCNC: 0.69 MG/DL (ref 0.5–1.4)
ERYTHROCYTE [DISTWIDTH] IN BLOOD BY AUTOMATED COUNT: 53.1 FL (ref 35.9–50)
GFR SERPLBLD CREATININE-BSD FMLA CKD-EPI: 86 ML/MIN/1.73 M 2
GLUCOSE SERPL-MCNC: 71 MG/DL (ref 65–99)
HCT VFR BLD AUTO: 38.8 % (ref 37–47)
HGB BLD-MCNC: 12.2 G/DL (ref 12–16)
MCH RBC QN AUTO: 31.6 PG (ref 27–33)
MCHC RBC AUTO-ENTMCNC: 31.4 G/DL (ref 32.2–35.5)
MCV RBC AUTO: 100.5 FL (ref 81.4–97.8)
PLATELET # BLD AUTO: 147 K/UL (ref 164–446)
PMV BLD AUTO: 11.5 FL (ref 9–12.9)
POTASSIUM SERPL-SCNC: 4.6 MMOL/L (ref 3.6–5.5)
RBC # BLD AUTO: 3.86 M/UL (ref 4.2–5.4)
SODIUM SERPL-SCNC: 140 MMOL/L (ref 135–145)
WBC # BLD AUTO: 6.3 K/UL (ref 4.8–10.8)

## 2023-12-01 PROCEDURE — 700102 HCHG RX REV CODE 250 W/ 637 OVERRIDE(OP): Performed by: SURGERY

## 2023-12-01 PROCEDURE — 94760 N-INVAS EAR/PLS OXIMETRY 1: CPT

## 2023-12-01 PROCEDURE — 97166 OT EVAL MOD COMPLEX 45 MIN: CPT

## 2023-12-01 PROCEDURE — 80048 BASIC METABOLIC PNL TOTAL CA: CPT

## 2023-12-01 PROCEDURE — 97535 SELF CARE MNGMENT TRAINING: CPT

## 2023-12-01 PROCEDURE — 99232 SBSQ HOSP IP/OBS MODERATE 35: CPT | Performed by: HOSPITALIST

## 2023-12-01 PROCEDURE — 700101 HCHG RX REV CODE 250: Performed by: SURGERY

## 2023-12-01 PROCEDURE — 94669 MECHANICAL CHEST WALL OSCILL: CPT

## 2023-12-01 PROCEDURE — 36415 COLL VENOUS BLD VENIPUNCTURE: CPT

## 2023-12-01 PROCEDURE — 85027 COMPLETE CBC AUTOMATED: CPT

## 2023-12-01 PROCEDURE — A9270 NON-COVERED ITEM OR SERVICE: HCPCS | Performed by: SURGERY

## 2023-12-01 PROCEDURE — A9270 NON-COVERED ITEM OR SERVICE: HCPCS | Performed by: INTERNAL MEDICINE

## 2023-12-01 PROCEDURE — 700111 HCHG RX REV CODE 636 W/ 250 OVERRIDE (IP): Performed by: INTERNAL MEDICINE

## 2023-12-01 PROCEDURE — 97163 PT EVAL HIGH COMPLEX 45 MIN: CPT

## 2023-12-01 PROCEDURE — 700102 HCHG RX REV CODE 250 W/ 637 OVERRIDE(OP): Performed by: INTERNAL MEDICINE

## 2023-12-01 PROCEDURE — 770006 HCHG ROOM/CARE - MED/SURG/GYN SEMI*

## 2023-12-01 RX ORDER — LIDOCAINE 4 G/G
1 PATCH TOPICAL EVERY 24 HOURS
Qty: 10 PATCH | Refills: 0 | Status: SHIPPED | OUTPATIENT
Start: 2023-12-01

## 2023-12-01 RX ORDER — ACETAMINOPHEN 500 MG
500 TABLET ORAL EVERY 6 HOURS PRN
COMMUNITY
Start: 2023-12-01

## 2023-12-01 RX ORDER — OXYCODONE HYDROCHLORIDE 5 MG/1
5 TABLET ORAL EVERY 4 HOURS PRN
Qty: 18 TABLET | Refills: 0 | Status: SHIPPED | OUTPATIENT
Start: 2023-12-01 | End: 2023-12-06

## 2023-12-01 RX ORDER — CELECOXIB 100 MG/1
100 CAPSULE ORAL DAILY
Qty: 5 CAPSULE | Refills: 0 | Status: SHIPPED | OUTPATIENT
Start: 2023-12-02 | End: 2023-12-07

## 2023-12-01 RX ADMIN — FLUTICASONE FUROATE, UMECLIDINIUM BROMIDE AND VILANTEROL TRIFENATATE 1 PUFF: 100; 62.5; 25 POWDER RESPIRATORY (INHALATION) at 04:38

## 2023-12-01 RX ADMIN — ACETAMINOPHEN 650 MG: 325 TABLET, FILM COATED ORAL at 20:14

## 2023-12-01 RX ADMIN — DOCUSATE SODIUM 50 MG AND SENNOSIDES 8.6 MG 2 TABLET: 8.6; 5 TABLET, FILM COATED ORAL at 17:08

## 2023-12-01 RX ADMIN — ACETAMINOPHEN 650 MG: 325 TABLET, FILM COATED ORAL at 12:17

## 2023-12-01 RX ADMIN — DOCUSATE SODIUM 50 MG AND SENNOSIDES 8.6 MG 2 TABLET: 8.6; 5 TABLET, FILM COATED ORAL at 04:38

## 2023-12-01 RX ADMIN — OXYCODONE HYDROCHLORIDE 10 MG: 10 TABLET ORAL at 17:10

## 2023-12-01 RX ADMIN — ESTRADIOL 0.5 MG: 1 TABLET ORAL at 04:38

## 2023-12-01 RX ADMIN — HYDROMORPHONE HYDROCHLORIDE 0.5 MG: 1 INJECTION, SOLUTION INTRAMUSCULAR; INTRAVENOUS; SUBCUTANEOUS at 03:30

## 2023-12-01 RX ADMIN — ENOXAPARIN SODIUM 40 MG: 100 INJECTION SUBCUTANEOUS at 17:09

## 2023-12-01 RX ADMIN — HYDROMORPHONE HYDROCHLORIDE 0.5 MG: 1 INJECTION, SOLUTION INTRAMUSCULAR; INTRAVENOUS; SUBCUTANEOUS at 13:29

## 2023-12-01 RX ADMIN — CYCLOBENZAPRINE 5 MG: 10 TABLET, FILM COATED ORAL at 14:07

## 2023-12-01 RX ADMIN — LEVOTHYROXINE SODIUM 50 MCG: 50 TABLET ORAL at 04:38

## 2023-12-01 RX ADMIN — PREGABALIN 100 MG: 100 CAPSULE ORAL at 04:38

## 2023-12-01 RX ADMIN — HYDROMORPHONE HYDROCHLORIDE 0.5 MG: 1 INJECTION, SOLUTION INTRAMUSCULAR; INTRAVENOUS; SUBCUTANEOUS at 18:55

## 2023-12-01 RX ADMIN — ACETAMINOPHEN 650 MG: 325 TABLET, FILM COATED ORAL at 08:46

## 2023-12-01 RX ADMIN — LIDOCAINE 1 PATCH: 4 PATCH TOPICAL at 17:09

## 2023-12-01 RX ADMIN — ACETAMINOPHEN 650 MG: 325 TABLET, FILM COATED ORAL at 17:08

## 2023-12-01 RX ADMIN — OXYCODONE HYDROCHLORIDE 10 MG: 10 TABLET ORAL at 02:12

## 2023-12-01 RX ADMIN — OXYCODONE HYDROCHLORIDE 10 MG: 10 TABLET ORAL at 12:17

## 2023-12-01 RX ADMIN — PREGABALIN 100 MG: 100 CAPSULE ORAL at 17:08

## 2023-12-01 RX ADMIN — OXYCODONE HYDROCHLORIDE 10 MG: 10 TABLET ORAL at 08:40

## 2023-12-01 RX ADMIN — CELECOXIB 100 MG: 100 CAPSULE ORAL at 04:38

## 2023-12-01 ASSESSMENT — PAIN DESCRIPTION - PAIN TYPE
TYPE: ACUTE PAIN

## 2023-12-01 ASSESSMENT — ACTIVITIES OF DAILY LIVING (ADL): TOILETING: INDEPENDENT

## 2023-12-01 ASSESSMENT — COGNITIVE AND FUNCTIONAL STATUS - GENERAL
STANDING UP FROM CHAIR USING ARMS: A LITTLE
SUGGESTED CMS G CODE MODIFIER DAILY ACTIVITY: CK
DAILY ACTIVITIY SCORE: 19
MOVING FROM LYING ON BACK TO SITTING ON SIDE OF FLAT BED: UNABLE
MOBILITY SCORE: 15
MOVING TO AND FROM BED TO CHAIR: UNABLE
WALKING IN HOSPITAL ROOM: A LITTLE
SUGGESTED CMS G CODE MODIFIER MOBILITY: CK
TOILETING: A LITTLE
CLIMB 3 TO 5 STEPS WITH RAILING: A LITTLE
PERSONAL GROOMING: A LITTLE
DRESSING REGULAR LOWER BODY CLOTHING: A LITTLE
HELP NEEDED FOR BATHING: A LITTLE
DRESSING REGULAR UPPER BODY CLOTHING: A LITTLE

## 2023-12-01 ASSESSMENT — ENCOUNTER SYMPTOMS
HEMOPTYSIS: 0
BLURRED VISION: 0
BRUISES/BLEEDS EASILY: 0
HEADACHES: 0
PND: 0
PALPITATIONS: 0
WHEEZING: 0
HEARTBURN: 0
DOUBLE VISION: 0
VOMITING: 0
NAUSEA: 0
CLAUDICATION: 0
CHILLS: 0
DEPRESSION: 0
BACK PAIN: 0
FEVER: 0
COUGH: 1
MYALGIAS: 0
DIZZINESS: 0

## 2023-12-01 ASSESSMENT — GAIT ASSESSMENTS
GAIT LEVEL OF ASSIST: MINIMAL ASSIST
DEVIATION: SHUFFLED GAIT
DISTANCE (FEET): 50
ASSISTIVE DEVICE: SINGLE POINT CANE
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
## 2023-12-01 NOTE — CARE PLAN
The patient is watcher    Shift Goals:   Clinical Goals: Pain management  Patient Goals: Pain control  Family Goals: MAMTA    Progress made toward(s) clinical / shift goals:    Problem: Fall Risk  Goal: Patient will remain free from falls  Outcome: Progressing   Patient mobility improving throughout shift. Though sore has ambulated the halls on the unit.     Patient is not progressing towards the following goals:    Problem: Pain - Standard  Goal: Alleviation of pain or a reduction in pain to the patient’s comfort goal  Outcome: Not Progressing   Patient having increased pain. At end of shift she began to scream out in pain stating her breast was in uncontrolled pain. Medicated per MAR. Discussed staying on top of pain meds and reached out to MD
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
## 2023-12-01 NOTE — CARE PLAN
The patient is Watcher - Medium risk of patient condition declining or worsening    Shift Goals  Clinical Goals: Pain control  Patient Goals: Pain control  Family Goals: MAMTA    Progress made toward(s) clinical / shift goals:  Patient A&Ox4. Assessed and updated on POC. Medicated for pain per MAR orders. Safety interventions in place    Patient is not progressing towards the following goals:

## 2023-12-01 NOTE — PROGRESS NOTES
Pt began to cry in pain stating that her pain all of the sudden increased. Pt pointing towards chest that had previously hurt but not in a different location. MD contacted. Stat EKG ordered. VS taken. BP high. Medicated with dilaudid for pain.
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
## 2023-12-01 NOTE — PROGRESS NOTES
Felt pt breast. Pt breast implant feels like it has air pockets. Pt states left breast implant burst x1 month ago and now the right implant feels similar to left. Pt continues to complain of pain and states it is more breast related. MD paged to discuss

## 2023-12-01 NOTE — PROGRESS NOTES
Paged OC for Hospitalist. Yosef Olvera paiged back. Discussed chest pain felt by pt. Discussed breast concern as implant feels very airy. Pt continues to feel pain. CT will be ordered.

## 2023-12-01 NOTE — PROGRESS NOTES
Pt daughter called. Discussed with patient daughter having an OT eval. Pt concerned for mother being home alone and asked if she would need to leave [place] to come take care of her. Discussed how we would know more when OT eval complete. Discussed pt having pain meds but not requesting them as often as she probably needed them. MD made aware of new CT results from last night

## 2023-12-02 LAB — VIT B12 SERPL-MCNC: 530 PG/ML (ref 211–911)

## 2023-12-02 PROCEDURE — 700102 HCHG RX REV CODE 250 W/ 637 OVERRIDE(OP): Performed by: INTERNAL MEDICINE

## 2023-12-02 PROCEDURE — 700111 HCHG RX REV CODE 636 W/ 250 OVERRIDE (IP): Mod: JZ | Performed by: INTERNAL MEDICINE

## 2023-12-02 PROCEDURE — 82652 VIT D 1 25-DIHYDROXY: CPT

## 2023-12-02 PROCEDURE — 94669 MECHANICAL CHEST WALL OSCILL: CPT

## 2023-12-02 PROCEDURE — 94760 N-INVAS EAR/PLS OXIMETRY 1: CPT

## 2023-12-02 PROCEDURE — 770006 HCHG ROOM/CARE - MED/SURG/GYN SEMI*

## 2023-12-02 PROCEDURE — 99232 SBSQ HOSP IP/OBS MODERATE 35: CPT | Performed by: HOSPITALIST

## 2023-12-02 PROCEDURE — A9270 NON-COVERED ITEM OR SERVICE: HCPCS | Performed by: INTERNAL MEDICINE

## 2023-12-02 PROCEDURE — A9270 NON-COVERED ITEM OR SERVICE: HCPCS | Performed by: SURGERY

## 2023-12-02 PROCEDURE — 700101 HCHG RX REV CODE 250: Performed by: SURGERY

## 2023-12-02 PROCEDURE — 700102 HCHG RX REV CODE 250 W/ 637 OVERRIDE(OP): Performed by: SURGERY

## 2023-12-02 PROCEDURE — 82607 VITAMIN B-12: CPT

## 2023-12-02 PROCEDURE — 36415 COLL VENOUS BLD VENIPUNCTURE: CPT

## 2023-12-02 RX ADMIN — OXYCODONE HYDROCHLORIDE 10 MG: 10 TABLET ORAL at 04:52

## 2023-12-02 RX ADMIN — PREGABALIN 100 MG: 100 CAPSULE ORAL at 17:13

## 2023-12-02 RX ADMIN — PREGABALIN 100 MG: 100 CAPSULE ORAL at 04:52

## 2023-12-02 RX ADMIN — LEVOTHYROXINE SODIUM 50 MCG: 50 TABLET ORAL at 04:52

## 2023-12-02 RX ADMIN — OXYCODONE HYDROCHLORIDE 10 MG: 10 TABLET ORAL at 14:02

## 2023-12-02 RX ADMIN — ESTRADIOL 0.5 MG: 1 TABLET ORAL at 04:52

## 2023-12-02 RX ADMIN — ACETAMINOPHEN 650 MG: 325 TABLET, FILM COATED ORAL at 09:40

## 2023-12-02 RX ADMIN — FLUTICASONE FUROATE, UMECLIDINIUM BROMIDE AND VILANTEROL TRIFENATATE 1 PUFF: 100; 62.5; 25 POWDER RESPIRATORY (INHALATION) at 04:51

## 2023-12-02 RX ADMIN — ENOXAPARIN SODIUM 40 MG: 100 INJECTION SUBCUTANEOUS at 17:14

## 2023-12-02 RX ADMIN — CELECOXIB 100 MG: 100 CAPSULE ORAL at 04:52

## 2023-12-02 RX ADMIN — OXYCODONE HYDROCHLORIDE 10 MG: 10 TABLET ORAL at 17:13

## 2023-12-02 RX ADMIN — ACETAMINOPHEN 650 MG: 325 TABLET, FILM COATED ORAL at 17:13

## 2023-12-02 RX ADMIN — ACETAMINOPHEN 650 MG: 325 TABLET, FILM COATED ORAL at 20:08

## 2023-12-02 RX ADMIN — DOCUSATE SODIUM 50 MG AND SENNOSIDES 8.6 MG 2 TABLET: 8.6; 5 TABLET, FILM COATED ORAL at 04:51

## 2023-12-02 RX ADMIN — LIDOCAINE 1 PATCH: 4 PATCH TOPICAL at 17:14

## 2023-12-02 RX ADMIN — OXYCODONE HYDROCHLORIDE 10 MG: 10 TABLET ORAL at 07:52

## 2023-12-02 ASSESSMENT — ENCOUNTER SYMPTOMS
FEVER: 0
BRUISES/BLEEDS EASILY: 0
VOMITING: 0
HEADACHES: 0
CLAUDICATION: 0
NAUSEA: 0
HEMOPTYSIS: 0
PALPITATIONS: 0
HEARTBURN: 0
DEPRESSION: 0
BACK PAIN: 0
PND: 0
DIZZINESS: 0
COUGH: 1
CHILLS: 0
BLURRED VISION: 0
WHEEZING: 0
DOUBLE VISION: 0
MYALGIAS: 0

## 2023-12-02 ASSESSMENT — PAIN DESCRIPTION - PAIN TYPE
TYPE: ACUTE PAIN
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
## 2023-12-02 NOTE — CARE PLAN
The patient is Stable - Low risk of patient condition declining or worsening    Shift Goals  Clinical Goals: pt will have pain rating 5/10 post intervention during the shift  Patient Goals: pain control  Family Goals: MAMTA    Progress made toward(s) clinical / shift goals:  patient ambulated with PT today.       Problem: Pain - Standard  Goal: Alleviation of pain or a reduction in pain to the patient’s comfort goal  Outcome: Not Progressing     Problem: Knowledge Deficit - Standard  Goal: Patient and family/care givers will demonstrate understanding of plan of care, disease process/condition, diagnostic tests and medications  Outcome: Progressing       Patient is not progressing towards the following goals: pt pain is still rating high. 7 or above out of 10      Problem: Pain - Standard  Goal: Alleviation of pain or a reduction in pain to the patient’s comfort goal  Outcome: Not Progressing

## 2023-12-02 NOTE — DISCHARGE PLANNING
1420 KYA CASAS spoke to Kurt from [facility] to inquire about bed availability.  Per Kurt, pt has an open MSP claim.   She has to call Medicare to close claim before Medicare will pay any SNFs.  RN CM called dtr [place] to assist pt to close MSP claim. Bedside RN and MD Smith notified.

## 2023-12-02 NOTE — CARE PLAN
The patient is Stable - Low risk of patient condition declining or worsening    Shift Goals  Clinical Goals: Pain management  Patient Goals: Rest  Family Goals: MAMTA    Progress made toward(s) clinical / shift goals:      Patient is not progressing towards the following goals:    Patient A&Ox3-4. Assessed and updated on POC. Medicated per MAR. Needs addressed at this time. Safety interventions in place
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
## 2023-12-02 NOTE — FLOWSHEET NOTE
12/02/23 1405   Incentive Spirometry Treatment   Incentive Spirometer Volume 600 mL   Chest Physiotherapy Treatment   $ PEP/CPT Performed PEP / Flutter     QID

## 2023-12-03 PROCEDURE — 94640 AIRWAY INHALATION TREATMENT: CPT

## 2023-12-03 PROCEDURE — 700102 HCHG RX REV CODE 250 W/ 637 OVERRIDE(OP): Performed by: SURGERY

## 2023-12-03 PROCEDURE — 700111 HCHG RX REV CODE 636 W/ 250 OVERRIDE (IP): Mod: JZ | Performed by: INTERNAL MEDICINE

## 2023-12-03 PROCEDURE — 700102 HCHG RX REV CODE 250 W/ 637 OVERRIDE(OP): Performed by: INTERNAL MEDICINE

## 2023-12-03 PROCEDURE — 94760 N-INVAS EAR/PLS OXIMETRY 1: CPT

## 2023-12-03 PROCEDURE — 99232 SBSQ HOSP IP/OBS MODERATE 35: CPT | Performed by: HOSPITALIST

## 2023-12-03 PROCEDURE — A9270 NON-COVERED ITEM OR SERVICE: HCPCS | Performed by: SURGERY

## 2023-12-03 PROCEDURE — A9270 NON-COVERED ITEM OR SERVICE: HCPCS | Performed by: INTERNAL MEDICINE

## 2023-12-03 PROCEDURE — 700101 HCHG RX REV CODE 250: Performed by: SURGERY

## 2023-12-03 PROCEDURE — 770006 HCHG ROOM/CARE - MED/SURG/GYN SEMI*

## 2023-12-03 PROCEDURE — 94669 MECHANICAL CHEST WALL OSCILL: CPT

## 2023-12-03 PROCEDURE — 700101 HCHG RX REV CODE 250: Performed by: EMERGENCY MEDICINE

## 2023-12-03 RX ADMIN — ACETAMINOPHEN 650 MG: 325 TABLET, FILM COATED ORAL at 17:40

## 2023-12-03 RX ADMIN — ACETAMINOPHEN 650 MG: 325 TABLET, FILM COATED ORAL at 13:05

## 2023-12-03 RX ADMIN — PREGABALIN 100 MG: 100 CAPSULE ORAL at 05:28

## 2023-12-03 RX ADMIN — ESTRADIOL 0.5 MG: 1 TABLET ORAL at 05:29

## 2023-12-03 RX ADMIN — OXYCODONE HYDROCHLORIDE 10 MG: 10 TABLET ORAL at 17:40

## 2023-12-03 RX ADMIN — ENOXAPARIN SODIUM 40 MG: 100 INJECTION SUBCUTANEOUS at 17:40

## 2023-12-03 RX ADMIN — ACETAMINOPHEN 650 MG: 325 TABLET, FILM COATED ORAL at 08:34

## 2023-12-03 RX ADMIN — OXYCODONE HYDROCHLORIDE 10 MG: 10 TABLET ORAL at 13:05

## 2023-12-03 RX ADMIN — FLUTICASONE FUROATE, UMECLIDINIUM BROMIDE AND VILANTEROL TRIFENATATE 1 PUFF: 100; 62.5; 25 POWDER RESPIRATORY (INHALATION) at 08:36

## 2023-12-03 RX ADMIN — OXYCODONE HYDROCHLORIDE 10 MG: 10 TABLET ORAL at 08:34

## 2023-12-03 RX ADMIN — CELECOXIB 100 MG: 100 CAPSULE ORAL at 05:28

## 2023-12-03 RX ADMIN — IPRATROPIUM BROMIDE AND ALBUTEROL SULFATE 3 ML: 2.5; .5 SOLUTION RESPIRATORY (INHALATION) at 10:44

## 2023-12-03 RX ADMIN — LEVOTHYROXINE SODIUM 50 MCG: 50 TABLET ORAL at 05:28

## 2023-12-03 RX ADMIN — OXYCODONE HYDROCHLORIDE 10 MG: 10 TABLET ORAL at 05:34

## 2023-12-03 RX ADMIN — DOCUSATE SODIUM 50 MG AND SENNOSIDES 8.6 MG 2 TABLET: 8.6; 5 TABLET, FILM COATED ORAL at 05:29

## 2023-12-03 RX ADMIN — LIDOCAINE 1 PATCH: 4 PATCH TOPICAL at 17:40

## 2023-12-03 RX ADMIN — CYCLOBENZAPRINE 5 MG: 10 TABLET, FILM COATED ORAL at 08:38

## 2023-12-03 RX ADMIN — DOCUSATE SODIUM 50 MG AND SENNOSIDES 8.6 MG 2 TABLET: 8.6; 5 TABLET, FILM COATED ORAL at 17:40

## 2023-12-03 RX ADMIN — PREGABALIN 100 MG: 100 CAPSULE ORAL at 17:40

## 2023-12-03 ASSESSMENT — ENCOUNTER SYMPTOMS
DIZZINESS: 0
BLURRED VISION: 0
DOUBLE VISION: 0
PND: 0
CHILLS: 0
DEPRESSION: 0
COUGH: 1
FEVER: 0
BACK PAIN: 0
CLAUDICATION: 0
NAUSEA: 0
VOMITING: 0
HEMOPTYSIS: 0
HEARTBURN: 0
PALPITATIONS: 0
HEADACHES: 0
MYALGIAS: 0
WHEEZING: 0
BRUISES/BLEEDS EASILY: 0

## 2023-12-03 ASSESSMENT — PAIN DESCRIPTION - PAIN TYPE
TYPE: ACUTE PAIN

## 2023-12-03 NOTE — CARE PLAN
The patient is Stable - Low risk of patient condition declining or worsening    Shift Goals  Clinical Goals: Patient will report pain level less than or equal to 7 with use of PRN pain medications, heat, and ambulation  Patient Goals: rest  Family Goals: NA    Progress made toward(s) clinical / shift goals:  PRN oxycodone and flexeril administered.  Scheduled tylenol administered.  Patient reports 7/10 pain with medications        Problem: Pain - Standard  Goal: Alleviation of pain or a reduction in pain to the patient’s comfort goal  Description: Target End Date:  Prior to discharge or change in level of care    Document on Vitals flowsheet    1.  Document pain using the appropriate pain scale per order or unit policy  2.  Educate and implement non-pharmacologic comfort measures (i.e. relaxation, distraction, massage, cold/heat therapy, etc.)  3.  Pain management medications as ordered  4.  Reassess pain after pain med administration per policy  5.  If opiods administered assess patient's response to pain medication is appropriate per POSS sedation scale  6.  Follow pain management plan developed in collaboration with patient and interdisciplinary team (including palliative care or pain specialists if applicable)  Outcome: Progressing     Patient is not progressing towards the following goals:
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
## 2023-12-03 NOTE — CARE PLAN
The patient is Stable - Low risk of patient condition declining or worsening    Shift Goals  Clinical Goals: Pain management  Patient Goals: Rest  Family Goals: MAMTA    Progress made toward(s) clinical / shift goals:  pain managed     Patient is not progressing towards the following goals:

## 2023-12-03 NOTE — PROGRESS NOTES
A&Ox4, complaints of chest pain, present on admission and patient states it is unchanged. Up to bathroom 1 person assist.  PO pain medication administered. 2L NC in use, IS encouraged.

## 2023-12-03 NOTE — CARE PLAN
The patient is Stable - Low risk of patient condition declining or worsening    Shift Goals  Clinical Goals: pain management, IS use  Patient Goals: rest  Family Goals: NA    Progress made toward(s) clinical / shift goals:    Problem: Pain - Standard  Goal: Alleviation of pain or a reduction in pain to the patient’s comfort goal  Outcome: Progressing     Problem: Knowledge Deficit - Standard  Goal: Patient and family/care givers will demonstrate understanding of plan of care, disease process/condition, diagnostic tests and medications  Outcome: Progressing     Problem: Fall Risk  Goal: Patient will remain free from falls  Outcome: Progressing       Patient is not progressing towards the following goals:

## 2023-12-04 LAB
1,25(OH)2D3 SERPL-MCNC: 42.8 PG/ML (ref 19.9–79.3)
BACTERIA BLD CULT: NORMAL
BACTERIA BLD CULT: NORMAL
SIGNIFICANT IND 70042: NORMAL
SIGNIFICANT IND 70042: NORMAL
SITE SITE: NORMAL
SITE SITE: NORMAL
SOURCE SOURCE: NORMAL
SOURCE SOURCE: NORMAL

## 2023-12-04 PROCEDURE — 700101 HCHG RX REV CODE 250: Performed by: SURGERY

## 2023-12-04 PROCEDURE — 94669 MECHANICAL CHEST WALL OSCILL: CPT

## 2023-12-04 PROCEDURE — A9270 NON-COVERED ITEM OR SERVICE: HCPCS | Performed by: INTERNAL MEDICINE

## 2023-12-04 PROCEDURE — 700102 HCHG RX REV CODE 250 W/ 637 OVERRIDE(OP): Performed by: SURGERY

## 2023-12-04 PROCEDURE — 97535 SELF CARE MNGMENT TRAINING: CPT

## 2023-12-04 PROCEDURE — A9270 NON-COVERED ITEM OR SERVICE: HCPCS | Performed by: HOSPITALIST

## 2023-12-04 PROCEDURE — 700102 HCHG RX REV CODE 250 W/ 637 OVERRIDE(OP): Performed by: INTERNAL MEDICINE

## 2023-12-04 PROCEDURE — 770006 HCHG ROOM/CARE - MED/SURG/GYN SEMI*

## 2023-12-04 PROCEDURE — 700102 HCHG RX REV CODE 250 W/ 637 OVERRIDE(OP): Performed by: HOSPITALIST

## 2023-12-04 PROCEDURE — 99232 SBSQ HOSP IP/OBS MODERATE 35: CPT | Performed by: HOSPITALIST

## 2023-12-04 PROCEDURE — A9270 NON-COVERED ITEM OR SERVICE: HCPCS | Performed by: SURGERY

## 2023-12-04 PROCEDURE — 700111 HCHG RX REV CODE 636 W/ 250 OVERRIDE (IP): Mod: JZ | Performed by: INTERNAL MEDICINE

## 2023-12-04 RX ORDER — LISINOPRIL 5 MG/1
2.5 TABLET ORAL
Status: DISCONTINUED | OUTPATIENT
Start: 2023-12-04 | End: 2023-12-06 | Stop reason: HOSPADM

## 2023-12-04 RX ADMIN — ESTRADIOL 0.5 MG: 1 TABLET ORAL at 04:58

## 2023-12-04 RX ADMIN — OXYCODONE HYDROCHLORIDE 10 MG: 10 TABLET ORAL at 14:17

## 2023-12-04 RX ADMIN — DOCUSATE SODIUM 50 MG AND SENNOSIDES 8.6 MG 2 TABLET: 8.6; 5 TABLET, FILM COATED ORAL at 04:58

## 2023-12-04 RX ADMIN — LISINOPRIL 2.5 MG: 5 TABLET ORAL at 10:03

## 2023-12-04 RX ADMIN — ACETAMINOPHEN 650 MG: 325 TABLET, FILM COATED ORAL at 10:02

## 2023-12-04 RX ADMIN — CELECOXIB 100 MG: 100 CAPSULE ORAL at 04:58

## 2023-12-04 RX ADMIN — ACETAMINOPHEN 650 MG: 325 TABLET, FILM COATED ORAL at 20:35

## 2023-12-04 RX ADMIN — PREGABALIN 100 MG: 100 CAPSULE ORAL at 04:58

## 2023-12-04 RX ADMIN — ACETAMINOPHEN 650 MG: 325 TABLET, FILM COATED ORAL at 17:17

## 2023-12-04 RX ADMIN — CYCLOBENZAPRINE 5 MG: 10 TABLET, FILM COATED ORAL at 10:03

## 2023-12-04 RX ADMIN — OXYCODONE HYDROCHLORIDE 10 MG: 10 TABLET ORAL at 10:03

## 2023-12-04 RX ADMIN — FLUTICASONE FUROATE, UMECLIDINIUM BROMIDE AND VILANTEROL TRIFENATATE 1 PUFF: 100; 62.5; 25 POWDER RESPIRATORY (INHALATION) at 04:58

## 2023-12-04 RX ADMIN — PREGABALIN 100 MG: 100 CAPSULE ORAL at 17:17

## 2023-12-04 RX ADMIN — LIDOCAINE 1 PATCH: 4 PATCH TOPICAL at 17:17

## 2023-12-04 RX ADMIN — ENOXAPARIN SODIUM 40 MG: 100 INJECTION SUBCUTANEOUS at 17:17

## 2023-12-04 RX ADMIN — DOCUSATE SODIUM 50 MG AND SENNOSIDES 8.6 MG 2 TABLET: 8.6; 5 TABLET, FILM COATED ORAL at 17:16

## 2023-12-04 RX ADMIN — ACETAMINOPHEN 650 MG: 325 TABLET, FILM COATED ORAL at 14:18

## 2023-12-04 RX ADMIN — LEVOTHYROXINE SODIUM 50 MCG: 50 TABLET ORAL at 04:59

## 2023-12-04 RX ADMIN — OXYCODONE HYDROCHLORIDE 10 MG: 10 TABLET ORAL at 03:40

## 2023-12-04 ASSESSMENT — ENCOUNTER SYMPTOMS
HEMOPTYSIS: 0
CHILLS: 0
NAUSEA: 0
PND: 0
DIZZINESS: 0
DOUBLE VISION: 0
VOMITING: 0
COUGH: 1
BRUISES/BLEEDS EASILY: 0
WHEEZING: 0
HEADACHES: 0
CLAUDICATION: 0
HEARTBURN: 0
BACK PAIN: 0
DEPRESSION: 0
FEVER: 0
PALPITATIONS: 0
MYALGIAS: 0
BLURRED VISION: 0

## 2023-12-04 ASSESSMENT — COGNITIVE AND FUNCTIONAL STATUS - GENERAL
TOILETING: A LITTLE
DRESSING REGULAR UPPER BODY CLOTHING: A LITTLE
PERSONAL GROOMING: A LITTLE
DAILY ACTIVITIY SCORE: 19
DRESSING REGULAR LOWER BODY CLOTHING: A LITTLE
SUGGESTED CMS G CODE MODIFIER DAILY ACTIVITY: CK
HELP NEEDED FOR BATHING: A LITTLE

## 2023-12-04 ASSESSMENT — PAIN DESCRIPTION - PAIN TYPE
TYPE: ACUTE PAIN
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
## 2023-12-04 NOTE — CARE PLAN
The patient is Stable - Low risk of patient condition declining or worsening    Shift Goals  Clinical Goals: Patient will report pain less than or equal to 7 with PRN pain medication administration  Patient Goals: rest, pain control  Family Goals: N/A    Progress made toward(s) clinical / shift goals:  Patient reports tolerable pain level with PO scheduled and PRN medications       Problem: Pain - Standard  Goal: Alleviation of pain or a reduction in pain to the patient’s comfort goal  Description: Target End Date:  Prior to discharge or change in level of care    Document on Vitals flowsheet    1.  Document pain using the appropriate pain scale per order or unit policy  2.  Educate and implement non-pharmacologic comfort measures (i.e. relaxation, distraction, massage, cold/heat therapy, etc.)  3.  Pain management medications as ordered  4.  Reassess pain after pain med administration per policy  5.  If opiods administered assess patient's response to pain medication is appropriate per POSS sedation scale  6.  Follow pain management plan developed in collaboration with patient and interdisciplinary team (including palliative care or pain specialists if applicable)  Outcome: Progressing     Patient is not progressing towards the following goals:

## 2023-12-04 NOTE — CARE PLAN
The patient is Stable - Low risk of patient condition declining or worsening    Shift Goals  Clinical Goals: decrease pain, increase ambulation  Patient Goals: rest, pain control  Family Goals: N/A    Progress made toward(s) clinical / shift goals: Plan of care and medications discussed and reviewed over with pt. Pt reported of right shoulder and rib cage pain rated 9/10. Medicated per MAR for pain. Pt currently on 2L of O2 at baseline. No s/s of respiratory distress or decline    Patient is not progressing towards the following goals:      Problem: Knowledge Deficit - Standard  Goal: Patient and family/care givers will demonstrate understanding of plan of care, disease process/condition, diagnostic tests and medications  Outcome: Progressing     Problem: Pain - Standard  Goal: Alleviation of pain or a reduction in pain to the patient’s comfort goal  Outcome: Progressing     Problem: Fall Risk  Goal: Patient will remain free from falls  Outcome: Progressing     Problem: Respiratory  Goal: Patient will achieve/maintain optimum respiratory ventilation and gas exchange  Outcome: Progressing

## 2023-12-05 PROCEDURE — 700102 HCHG RX REV CODE 250 W/ 637 OVERRIDE(OP): Performed by: SURGERY

## 2023-12-05 PROCEDURE — 700101 HCHG RX REV CODE 250: Performed by: SURGERY

## 2023-12-05 PROCEDURE — 700111 HCHG RX REV CODE 636 W/ 250 OVERRIDE (IP): Mod: JZ | Performed by: INTERNAL MEDICINE

## 2023-12-05 PROCEDURE — A9270 NON-COVERED ITEM OR SERVICE: HCPCS | Performed by: SURGERY

## 2023-12-05 PROCEDURE — 99233 SBSQ HOSP IP/OBS HIGH 50: CPT | Performed by: INTERNAL MEDICINE

## 2023-12-05 PROCEDURE — A9270 NON-COVERED ITEM OR SERVICE: HCPCS | Performed by: INTERNAL MEDICINE

## 2023-12-05 PROCEDURE — 700102 HCHG RX REV CODE 250 W/ 637 OVERRIDE(OP): Performed by: INTERNAL MEDICINE

## 2023-12-05 PROCEDURE — 770006 HCHG ROOM/CARE - MED/SURG/GYN SEMI*

## 2023-12-05 RX ORDER — BENZONATATE 100 MG/1
100 CAPSULE ORAL 3 TIMES DAILY PRN
Status: DISCONTINUED | OUTPATIENT
Start: 2023-12-05 | End: 2023-12-06 | Stop reason: HOSPADM

## 2023-12-05 RX ADMIN — ACETAMINOPHEN 650 MG: 325 TABLET, FILM COATED ORAL at 13:31

## 2023-12-05 RX ADMIN — ESTRADIOL 0.5 MG: 1 TABLET ORAL at 05:28

## 2023-12-05 RX ADMIN — PREGABALIN 100 MG: 100 CAPSULE ORAL at 17:15

## 2023-12-05 RX ADMIN — OXYCODONE HYDROCHLORIDE 10 MG: 10 TABLET ORAL at 13:35

## 2023-12-05 RX ADMIN — ENOXAPARIN SODIUM 40 MG: 100 INJECTION SUBCUTANEOUS at 17:15

## 2023-12-05 RX ADMIN — OXYCODONE HYDROCHLORIDE 10 MG: 10 TABLET ORAL at 09:31

## 2023-12-05 RX ADMIN — ACETAMINOPHEN 650 MG: 325 TABLET, FILM COATED ORAL at 17:15

## 2023-12-05 RX ADMIN — DOCUSATE SODIUM 50 MG AND SENNOSIDES 8.6 MG 2 TABLET: 8.6; 5 TABLET, FILM COATED ORAL at 05:25

## 2023-12-05 RX ADMIN — LEVOTHYROXINE SODIUM 50 MCG: 50 TABLET ORAL at 05:26

## 2023-12-05 RX ADMIN — LIDOCAINE 1 PATCH: 4 PATCH TOPICAL at 17:15

## 2023-12-05 RX ADMIN — PREGABALIN 100 MG: 100 CAPSULE ORAL at 05:27

## 2023-12-05 RX ADMIN — ACETAMINOPHEN 650 MG: 325 TABLET, FILM COATED ORAL at 20:12

## 2023-12-05 RX ADMIN — ACETAMINOPHEN 650 MG: 325 TABLET, FILM COATED ORAL at 09:24

## 2023-12-05 RX ADMIN — DOCUSATE SODIUM 50 MG AND SENNOSIDES 8.6 MG 2 TABLET: 8.6; 5 TABLET, FILM COATED ORAL at 17:15

## 2023-12-05 RX ADMIN — CELECOXIB 100 MG: 100 CAPSULE ORAL at 05:26

## 2023-12-05 RX ADMIN — OXYCODONE HYDROCHLORIDE 10 MG: 10 TABLET ORAL at 21:56

## 2023-12-05 RX ADMIN — OXYCODONE HYDROCHLORIDE 10 MG: 10 TABLET ORAL at 17:24

## 2023-12-05 RX ADMIN — FLUTICASONE FUROATE, UMECLIDINIUM BROMIDE AND VILANTEROL TRIFENATATE 1 PUFF: 100; 62.5; 25 POWDER RESPIRATORY (INHALATION) at 05:30

## 2023-12-05 ASSESSMENT — PAIN DESCRIPTION - PAIN TYPE
TYPE: ACUTE PAIN

## 2023-12-05 ASSESSMENT — ENCOUNTER SYMPTOMS
COUGH: 1
SHORTNESS OF BREATH: 1
BACK PAIN: 1
MYALGIAS: 1

## 2023-12-05 NOTE — THERAPY
Occupational Therapy  Daily Treatment     Patient Name: Isa Randallac  Age:  83 y.o., Sex:  female  Medical Record #: [id number]  Today's Date: 12/4/2023     Precautions  Precautions: Fall Risk  Comments: prior right clavicle fx; 3rd right rib    Assessment    Pt seen for OT treatment. Pt donned/doffed underwear with min A, walked to the sink with CGA, and washed hands/face while standing at the sink with SBA. Pt noted to have increased work of breathing and O2 sat of 82 with activity. Pt recovered to 92% with pursed lip breathing. RN notified. Pt educated regarding the role of OT, pathology of bedrest, and introduced to energy conservation principles. Pt current functional performance limited by weakness, pain, and impaired activity tolerance. Pt will continue to benefit from skilled OT while admitted to acute care.     Plan    Treatment Plan Status: Continue Current Treatment Plan  Type of Treatment: Self Care / Activities of Daily Living, Adaptive Equipment, Neuro Re-Education / Balance, Therapeutic Exercises, Therapeutic Activity  Treatment Frequency: 3 Times per Week  Treatment Duration: Until Therapy Goals Met    DC Equipment Recommendations: Unable to determine at this time  Discharge Recommendations: Recommend post-acute placement for additional occupational therapy services prior to discharge home     Objective     12/04/23 1712   Vitals   Vitals Comments HR up to 107 with activity, O2 down to 82 (recovered to 92 with pursed lip breathing); RN notified   Pain   Pain Scales 0 to 10 Scale    Pain 0 - 10 Group   Therapist Pain Assessment During Activity;Nurse Notified  (not rated, agreeable to activity)   Non Verbal Descriptors   Non Verbal Scale  Calm   Cognition    Cognition / Consciousness X   Level of Consciousness Alert   Safety Awareness Impaired   New Learning Impaired   Comments Pleasant and cooperative, joking with therapist at times   Active ROM Upper Body   Active ROM Upper Body  X   Dominant Hand  Right   Comments R shoulder ROM limited to ~30 degrees due to prior clavicle fracture   Other Treatments   Other Treatments Provided Discussed pursed lip breathing and energy conservation   Balance   Sitting Balance (Static) Good   Sitting Balance (Dynamic) Good   Standing Balance (Static) Fair -   Standing Balance (Dynamic) Fair -   Weight Shift Sitting Good   Weight Shift Standing Good   Skilled Intervention Verbal Cuing;Facilitation   Comments w/ HHA, pt declined walker   Bed Mobility    Supine to Sit Standby Assist   Sit to Supine   (up to chair post)   Scooting Standby Assist   Skilled Intervention Verbal Cuing;Facilitation   Comments some extra time required   Activities of Daily Living   Grooming Standby Assist;Standing  (washed hands and face at sink)   Lower Body Dressing Minimal Assist  (don underwear)   Toileting   (declined the need)   Skilled Intervention Verbal Cuing;Facilitation   Functional Mobility   Sit to Stand Standby Assist   Bed, Chair, Wheelchair Transfer Contact Guard Assist   Transfer Method Stand Step   Mobility EOB>sink>chair   Skilled Intervention Verbal Cuing;Facilitation   Comments w/ HHA   Visual Perception   Visual Perception  Not Tested   Activity Tolerance   Sitting in Chair up to chair post   Sitting Edge of Bed >5 min   Standing >5 min   Comments limited by weakness, pain   Patient / Family Goals   Patient / Family Goal #1 To get stronger   Goal #1 Outcome Progressing as expected   Short Term Goals   Short Term Goal # 1 Pt will tolerate standing for 10 min with SPV to perform related ADLs/IADLs   Goal Outcome # 1 Progressing as expected   Short Term Goal # 2 Pt will complete LBD with SPV   Goal Outcome # 2 Progressing as expected   Short Term Goal # 3 Pt will complete toilet transfer with SPV   Goal Outcome # 3 Goal not met   Short Term Goal # 4 Pt will complete toileting with SPV   Goal Outcome # 4 Goal not met   Education Group   Education Provided Activities of Daily  Living;Role of Occupational Therapist;Energy Conservation  (pursed lip breathing)   Role of Occupational Therapist Patient Response Patient;Acceptance;Explanation;Verbal Demonstration   Energy Conservation Patient Response Patient;Acceptance;Explanation;Verbal Demonstration   ADL Patient Response Patient;Acceptance;Explanation;Demonstration;Verbal Demonstration;Action Demonstration   Occupational Therapy Treatment Plan    O.T. Treatment Plan Continue Current Treatment Plan   Treatment Interventions Self Care / Activities of Daily Living;Adaptive Equipment;Neuro Re-Education / Balance;Therapeutic Exercises;Therapeutic Activity   Treatment Frequency 3 Times per Week   Duration Until Therapy Goals Met
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
## 2023-12-05 NOTE — CARE PLAN
Problem: Hyperinflation  Goal: Prevent or improve atelectasis  Description: Target End Date:  3 to 4 days    1. Instruct incentive spirometry usage  2.  Perform hyperinflation therapy as indicated  Outcome: Progressing  Flowsheets (Taken 12/4/2023 0707)  Hyperinflation Protocol Goals/Outcome: Increase and/or stable inspiratory capacity for 24 hours  Hyperinflation Protocol Indications: Pneumonia       Respiratory Update    Treatment modality: PEP  Frequency: BID      Pt tolerating current treatments well with no adverse reactions.

## 2023-12-05 NOTE — CARE PLAN
The patient is Stable - Low risk of patient condition declining or worsening    Shift Goals  Clinical Goals: ensure safety and control pain  Patient Goals: rest  Family Goals: merrick    Progress made toward(s) clinical / shift goals:  Received patient in bed alert and oriented x4. Denies pain. Administered scheduled medications. Hourly rounds performed. Fall precautions in place and call light within reach.     Patient is not progressing towards the following goals:    Problem: Knowledge Deficit - Standard  Goal: Patient and family/care givers will demonstrate understanding of plan of care, disease process/condition, diagnostic tests and medications  Outcome: Not Progressing  Note: Plan to advance goal: explain disease process/condition, treatment plan, diagnostic tests and medications every shift.

## 2023-12-06 VITALS
OXYGEN SATURATION: 91 % | SYSTOLIC BLOOD PRESSURE: 153 MMHG | HEIGHT: 66 IN | HEART RATE: 89 BPM | DIASTOLIC BLOOD PRESSURE: 53 MMHG | TEMPERATURE: 97.9 F | WEIGHT: 141.09 LBS | RESPIRATION RATE: 17 BRPM | BODY MASS INDEX: 22.68 KG/M2

## 2023-12-06 PROBLEM — R59.0 LYMPHADENOPATHY, MEDIASTINAL: Status: ACTIVE | Noted: 2023-12-06

## 2023-12-06 PROBLEM — J96.20 ACUTE ON CHRONIC RESPIRATORY FAILURE (HCC): Status: RESOLVED | Noted: 2023-11-29 | Resolved: 2023-12-06
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
## 2023-12-06 PROCEDURE — A9270 NON-COVERED ITEM OR SERVICE: HCPCS | Performed by: HOSPITALIST

## 2023-12-06 PROCEDURE — 700102 HCHG RX REV CODE 250 W/ 637 OVERRIDE(OP): Performed by: HOSPITALIST

## 2023-12-06 PROCEDURE — 700102 HCHG RX REV CODE 250 W/ 637 OVERRIDE(OP): Performed by: INTERNAL MEDICINE

## 2023-12-06 PROCEDURE — A9270 NON-COVERED ITEM OR SERVICE: HCPCS | Performed by: INTERNAL MEDICINE

## 2023-12-06 PROCEDURE — 99239 HOSP IP/OBS DSCHRG MGMT >30: CPT | Performed by: INTERNAL MEDICINE

## 2023-12-06 PROCEDURE — A9270 NON-COVERED ITEM OR SERVICE: HCPCS | Performed by: SURGERY

## 2023-12-06 PROCEDURE — 700102 HCHG RX REV CODE 250 W/ 637 OVERRIDE(OP): Performed by: SURGERY

## 2023-12-06 RX ORDER — OXYCODONE HYDROCHLORIDE 5 MG/1
5 TABLET ORAL EVERY 6 HOURS PRN
Qty: 20 TABLET | Refills: 0 | Status: SHIPPED
Start: 2023-12-06 | End: 2023-12-11

## 2023-12-06 RX ADMIN — LEVOTHYROXINE SODIUM 50 MCG: 50 TABLET ORAL at 04:15

## 2023-12-06 RX ADMIN — FLUTICASONE FUROATE, UMECLIDINIUM BROMIDE AND VILANTEROL TRIFENATATE 1 PUFF: 100; 62.5; 25 POWDER RESPIRATORY (INHALATION) at 04:17

## 2023-12-06 RX ADMIN — LISINOPRIL 2.5 MG: 5 TABLET ORAL at 04:15

## 2023-12-06 RX ADMIN — ACETAMINOPHEN 650 MG: 325 TABLET, FILM COATED ORAL at 13:56

## 2023-12-06 RX ADMIN — PREGABALIN 100 MG: 100 CAPSULE ORAL at 04:15

## 2023-12-06 RX ADMIN — ESTRADIOL 0.5 MG: 1 TABLET ORAL at 04:15

## 2023-12-06 RX ADMIN — OXYCODONE HYDROCHLORIDE 10 MG: 10 TABLET ORAL at 08:41

## 2023-12-06 RX ADMIN — OXYCODONE HYDROCHLORIDE 10 MG: 10 TABLET ORAL at 13:56

## 2023-12-06 RX ADMIN — OXYCODONE HYDROCHLORIDE 10 MG: 10 TABLET ORAL at 02:50

## 2023-12-06 ASSESSMENT — PAIN DESCRIPTION - PAIN TYPE
TYPE: ACUTE PAIN
TYPE: ACUTE PAIN
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
## 2023-12-06 NOTE — CARE PLAN
The patient is Stable - Low risk of patient condition declining or worsening    Shift Goals  Clinical Goals: ensure safety and control pain  Patient Goals: rest  Family Goals: merrick    Progress made toward(s) clinical / shift goals: Received patient in bed alert and oriented x4. Reports pain 10/10. Administered scheduled and PRN medications. O2 at 2L via NC. Patient updated on POC and verbalized understanding. Hourly rounds performed. Fall precautions in place and call light with in reach.    Patient is not progressing towards the following goals:    Problem: Pain - Standard  Goal: Alleviation of pain or a reduction in pain to the patient’s comfort goal  Description: Target End Date:  Prior to discharge or change in level of care    Document on Vitals flowsheet    1.  Document pain using the appropriate pain scale per order or unit policy  2.  Educate and implement non-pharmacologic comfort measures (i.e. relaxation, distraction, massage, cold/heat therapy, etc.)  3.  Pain management medications as ordered  4.  Reassess pain after pain med administration per policy  5.  If opiods administered assess patient's response to pain medication is appropriate per POSS sedation scale  6.  Follow pain management plan developed in collaboration with patient and interdisciplinary team (including palliative care or pain specialists if applicable)  Outcome: Not Progressing     Problem: Knowledge Deficit - Standard  Goal: Patient and family/care givers will demonstrate understanding of plan of care, disease process/condition, diagnostic tests and medications  Description: Target End Date:  1-3 days or as soon as patient condition allows    Document in Patient Education    1.  Patient and family/caregiver oriented to unit, equipment, visitation policy and means for communicating concern  2.  Complete/review Learning Assessment  3.  Assess knowledge level of disease process/condition, treatment plan, diagnostic tests and  medications  4.  Explain disease process/condition, treatment plan, diagnostic tests and medications  Outcome: Not Progressing
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
## 2023-12-06 NOTE — THERAPY
"Occupational Therapy Contact Note    Patient Name: Isa Maravilla  Age:  83 y.o., Sex:  female  Medical Record #: [id number]  Today's Date: 12/6/2023    Discussed missed therapy with RN. OT informed RN of  OT tx  attempt and pt still eating lunch and is d/cing to [facility]. Pt stated she wanted to eat first before transferring to [facility]. OT tx held. Recommend continued OT services to maximize functional recovery.         12/06/23 2478   Treatment Variance   Reason For Missed Therapy Non-Medical - Other (Please Comment)  (Pt in the process for d/cing to [facility])   Interdisciplinary Plan of Care Collaboration   IDT Collaboration with  Nursing;;   Collaboration Comments RN informed of OT attempt to treat patient. Pt is in the process of d.cing to [facility]. Pt stated she \"just started to eat and wanted to finish before she leaves. OT tx held.   Session Information   Date / Session Number  12/4 #2 (2/3, 12/7) Attempted OT tx 12/6. Pt in the process of eating a late lunch and then  discharging. OT tx held.       "

## 2023-12-06 NOTE — DISCHARGE PLANNING
Case Management Discharge Planning    Admission Date: 11/29/2023  GMLOS: 4.5  ALOS: 7    6-Clicks ADL Score: 19  6-Clicks Mobility Score: 15  PT and/or OT Eval ordered: Yes  Post-acute Referrals Ordered: Yes  Post-acute Choice Obtained: Yes  Has referral(s) been sent to post-acute provider:  Yes      Anticipated Discharge Dispo: Discharge Disposition: D/T to SNF with Medicare cert in anticipation of skilled care (03)    DME Needed: No    Action(s) Taken: LMSW left VM with Kary from [facility].     Addendum @ 0951: [facility] accepting pt today and will be here at 1500 to  pt via wheelchair. LMSW to complete COBRA for pt.     Addendum @ 1014: LMSW completed COBRA and put it with pt's chart. LMSW called Dania and informed her of acceptance and DC. LMSW called [facility] and informed them of 02 needs for transport.     Addendum @ 1059: Pt will DC at 1630 with [facility]'s transportation.     Escalations Completed: None    Medically Clear: Yes    Next Steps: LMSW to follow as needed for DC.    Barriers to Discharge: Pending Placement    Is the patient up for discharge tomorrow: No
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
## 2023-12-07 NOTE — THERAPY
Physical Therapy Contact Note    Patient Name: Isa Maravilla  Age:  83 y.o., Sex:  female  Medical Record #: [id number]  Today's Date: 12/6/2023    Pt discharging today~ 4:30 confirmed; will follow up If dc delayed for continuation of PT plan of care    Angela WEBB, PT,  [phone]

## 2024-05-02 ENCOUNTER — HOSPITAL ENCOUNTER (OUTPATIENT)
Dept: RADIOLOGY | Facility: MEDICAL CENTER | Age: 84
End: 2024-05-02
Payer: MEDICARE

## 2024-05-03 ENCOUNTER — APPOINTMENT (OUTPATIENT)
Dept: RADIOLOGY | Facility: MEDICAL CENTER | Age: 84
DRG: 563 | End: 2024-05-03
Attending: STUDENT IN AN ORGANIZED HEALTH CARE EDUCATION/TRAINING PROGRAM
Payer: MEDICARE

## 2024-05-03 ENCOUNTER — HOSPITAL ENCOUNTER (INPATIENT)
Facility: MEDICAL CENTER | Age: 84
LOS: 3 days | DRG: 563 | End: 2024-05-08
Attending: HOSPITALIST | Admitting: STUDENT IN AN ORGANIZED HEALTH CARE EDUCATION/TRAINING PROGRAM
Payer: MEDICARE

## 2024-05-03 DIAGNOSIS — F51.01 PRIMARY INSOMNIA: ICD-10-CM

## 2024-05-03 DIAGNOSIS — F19.982 DRUG-INDUCED INSOMNIA (HCC): ICD-10-CM

## 2024-05-03 DIAGNOSIS — S72.001A: ICD-10-CM

## 2024-05-03 PROBLEM — M79.601 DIFFUSE PAIN IN RIGHT UPPER EXTREMITY: Status: ACTIVE | Noted: 2024-05-03

## 2024-05-03 PROBLEM — S42.291A HUMERAL HEAD FRACTURE, RIGHT, CLOSED, INITIAL ENCOUNTER: Status: ACTIVE | Noted: 2024-05-03

## 2024-05-03 PROBLEM — G62.9 NEUROPATHY: Status: ACTIVE | Noted: 2024-05-03

## 2024-05-03 PROBLEM — Z71.89 ACP (ADVANCE CARE PLANNING): Status: ACTIVE | Noted: 2024-05-03

## 2024-05-03 PROBLEM — K21.9 GERD (GASTROESOPHAGEAL REFLUX DISEASE): Status: ACTIVE | Noted: 2024-05-03

## 2024-05-03 PROBLEM — G47.00 INSOMNIA: Status: ACTIVE | Noted: 2024-05-03

## 2024-05-03 LAB
ALBUMIN SERPL BCP-MCNC: 3.9 G/DL (ref 3.2–4.9)
ALBUMIN/GLOB SERPL: 1.4 G/DL
ALP SERPL-CCNC: 79 U/L (ref 30–99)
ALT SERPL-CCNC: 13 U/L (ref 2–50)
ANION GAP SERPL CALC-SCNC: 11 MMOL/L (ref 7–16)
APPEARANCE UR: CLEAR
AST SERPL-CCNC: 43 U/L (ref 12–45)
BASOPHILS # BLD AUTO: 0.4 % (ref 0–1.8)
BASOPHILS # BLD: 0.05 K/UL (ref 0–0.12)
BILIRUB SERPL-MCNC: 0.5 MG/DL (ref 0.1–1.5)
BILIRUB UR QL STRIP.AUTO: NEGATIVE
BUN SERPL-MCNC: 13 MG/DL (ref 8–22)
CALCIUM ALBUM COR SERPL-MCNC: 8.8 MG/DL (ref 8.5–10.5)
CALCIUM SERPL-MCNC: 8.7 MG/DL (ref 8.5–10.5)
CHLORIDE SERPL-SCNC: 103 MMOL/L (ref 96–112)
CK SERPL-CCNC: 724 U/L (ref 0–154)
CO2 SERPL-SCNC: 26 MMOL/L (ref 20–33)
COLOR UR: YELLOW
CREAT SERPL-MCNC: 0.55 MG/DL (ref 0.5–1.4)
CRP SERPL HS-MCNC: 1.56 MG/DL (ref 0–0.75)
EKG IMPRESSION: NORMAL
EOSINOPHIL # BLD AUTO: 0.18 K/UL (ref 0–0.51)
EOSINOPHIL NFR BLD: 1.6 % (ref 0–6.9)
ERYTHROCYTE [DISTWIDTH] IN BLOOD BY AUTOMATED COUNT: 47.9 FL (ref 35.9–50)
GFR SERPLBLD CREATININE-BSD FMLA CKD-EPI: 90 ML/MIN/1.73 M 2
GLOBULIN SER CALC-MCNC: 2.8 G/DL (ref 1.9–3.5)
GLUCOSE SERPL-MCNC: 90 MG/DL (ref 65–99)
GLUCOSE UR STRIP.AUTO-MCNC: NEGATIVE MG/DL
HCT VFR BLD AUTO: 41.6 % (ref 37–47)
HGB BLD-MCNC: 13.7 G/DL (ref 12–16)
IMM GRANULOCYTES # BLD AUTO: 0.02 K/UL (ref 0–0.11)
IMM GRANULOCYTES NFR BLD AUTO: 0.2 % (ref 0–0.9)
INR PPP: 1.05 (ref 0.87–1.13)
KETONES UR STRIP.AUTO-MCNC: 15 MG/DL
LEUKOCYTE ESTERASE UR QL STRIP.AUTO: NEGATIVE
LYMPHOCYTES # BLD AUTO: 3.16 K/UL (ref 1–4.8)
LYMPHOCYTES NFR BLD: 27.8 % (ref 22–41)
MCH RBC QN AUTO: 30.9 PG (ref 27–33)
MCHC RBC AUTO-ENTMCNC: 32.9 G/DL (ref 32.2–35.5)
MCV RBC AUTO: 93.9 FL (ref 81.4–97.8)
MICRO URNS: ABNORMAL
MONOCYTES # BLD AUTO: 0.83 K/UL (ref 0–0.85)
MONOCYTES NFR BLD AUTO: 7.3 % (ref 0–13.4)
NEUTROPHILS # BLD AUTO: 7.13 K/UL (ref 1.82–7.42)
NEUTROPHILS NFR BLD: 62.7 % (ref 44–72)
NITRITE UR QL STRIP.AUTO: NEGATIVE
NRBC # BLD AUTO: 0 K/UL
NRBC BLD-RTO: 0 /100 WBC (ref 0–0.2)
NT-PROBNP SERPL IA-MCNC: 81 PG/ML (ref 0–125)
PH UR STRIP.AUTO: 5.5 [PH] (ref 5–8)
PLATELET # BLD AUTO: 203 K/UL (ref 164–446)
PMV BLD AUTO: 11.3 FL (ref 9–12.9)
POTASSIUM SERPL-SCNC: 4.1 MMOL/L (ref 3.6–5.5)
PROCALCITONIN SERPL-MCNC: 0.05 NG/ML
PROT SERPL-MCNC: 6.7 G/DL (ref 6–8.2)
PROT UR QL STRIP: NEGATIVE MG/DL
PROTHROMBIN TIME: 13.8 SEC (ref 12–14.6)
RBC # BLD AUTO: 4.43 M/UL (ref 4.2–5.4)
RBC UR QL AUTO: NEGATIVE
SODIUM SERPL-SCNC: 140 MMOL/L (ref 135–145)
SP GR UR STRIP.AUTO: 1.02
TSH SERPL DL<=0.005 MIU/L-ACNC: 1.51 UIU/ML (ref 0.38–5.33)
UROBILINOGEN UR STRIP.AUTO-MCNC: 0.2 MG/DL
WBC # BLD AUTO: 11.4 K/UL (ref 4.8–10.8)

## 2024-05-03 PROCEDURE — 99406 BEHAV CHNG SMOKING 3-10 MIN: CPT | Performed by: STUDENT IN AN ORGANIZED HEALTH CARE EDUCATION/TRAINING PROGRAM

## 2024-05-03 PROCEDURE — 99497 ADVNCD CARE PLAN 30 MIN: CPT | Mod: 25 | Performed by: STUDENT IN AN ORGANIZED HEALTH CARE EDUCATION/TRAINING PROGRAM

## 2024-05-03 PROCEDURE — 99223 1ST HOSP IP/OBS HIGH 75: CPT | Mod: 25 | Performed by: STUDENT IN AN ORGANIZED HEALTH CARE EDUCATION/TRAINING PROGRAM

## 2024-05-03 PROCEDURE — 93010 ELECTROCARDIOGRAM REPORT: CPT | Performed by: INTERNAL MEDICINE

## 2024-05-03 RX ORDER — ACETAMINOPHEN 325 MG/1
650 TABLET ORAL EVERY 6 HOURS PRN
Status: DISCONTINUED | OUTPATIENT
Start: 2024-05-08 | End: 2024-05-08 | Stop reason: HOSPADM

## 2024-05-03 RX ORDER — HYDROMORPHONE HYDROCHLORIDE 1 MG/ML
0.5 INJECTION, SOLUTION INTRAMUSCULAR; INTRAVENOUS; SUBCUTANEOUS EVERY 8 HOURS PRN
Status: DISCONTINUED | OUTPATIENT
Start: 2024-05-03 | End: 2024-05-06

## 2024-05-03 RX ORDER — HYDROXYZINE HYDROCHLORIDE 25 MG/1
25 TABLET, FILM COATED ORAL EVERY MORNING
COMMUNITY

## 2024-05-03 RX ORDER — IBUPROFEN 800 MG/1
800 TABLET ORAL 3 TIMES DAILY PRN
Status: DISCONTINUED | OUTPATIENT
Start: 2024-05-06 | End: 2024-05-04

## 2024-05-03 RX ORDER — PREGABALIN 100 MG/1
100 CAPSULE ORAL 2 TIMES DAILY
Status: DISCONTINUED | OUTPATIENT
Start: 2024-05-03 | End: 2024-05-08 | Stop reason: HOSPADM

## 2024-05-03 RX ORDER — MONTELUKAST SODIUM 10 MG/1
10 TABLET ORAL NIGHTLY
Status: DISCONTINUED | OUTPATIENT
Start: 2024-05-03 | End: 2024-05-08 | Stop reason: HOSPADM

## 2024-05-03 RX ORDER — LEVALBUTEROL INHALATION SOLUTION 1.25 MG/3ML
1.25 SOLUTION RESPIRATORY (INHALATION)
Status: DISCONTINUED | OUTPATIENT
Start: 2024-05-03 | End: 2024-05-08 | Stop reason: HOSPADM

## 2024-05-03 RX ORDER — HYDROCODONE BITARTRATE AND ACETAMINOPHEN 5; 325 MG/1; MG/1
1 TABLET ORAL EVERY MORNING
Status: ON HOLD | COMMUNITY
End: 2024-05-08

## 2024-05-03 RX ORDER — CITALOPRAM 40 MG/1
40 TABLET ORAL EVERY MORNING
COMMUNITY

## 2024-05-03 RX ORDER — LABETALOL HYDROCHLORIDE 5 MG/ML
10 INJECTION, SOLUTION INTRAVENOUS EVERY 4 HOURS PRN
Status: DISCONTINUED | OUTPATIENT
Start: 2024-05-03 | End: 2024-05-08 | Stop reason: HOSPADM

## 2024-05-03 RX ORDER — CYCLOBENZAPRINE HCL 5 MG
5 TABLET ORAL
COMMUNITY

## 2024-05-03 RX ORDER — KETOROLAC TROMETHAMINE 15 MG/ML
15 INJECTION, SOLUTION INTRAMUSCULAR; INTRAVENOUS EVERY 6 HOURS
Qty: 12 ML | Refills: 0 | Status: DISCONTINUED | OUTPATIENT
Start: 2024-05-03 | End: 2024-05-04

## 2024-05-03 RX ORDER — ONDANSETRON 2 MG/ML
4 INJECTION INTRAMUSCULAR; INTRAVENOUS EVERY 4 HOURS PRN
Status: DISCONTINUED | OUTPATIENT
Start: 2024-05-03 | End: 2024-05-08 | Stop reason: HOSPADM

## 2024-05-03 RX ORDER — IPRATROPIUM BROMIDE AND ALBUTEROL SULFATE 2.5; .5 MG/3ML; MG/3ML
3 SOLUTION RESPIRATORY (INHALATION)
Status: DISCONTINUED | OUTPATIENT
Start: 2024-05-03 | End: 2024-05-03

## 2024-05-03 RX ORDER — PREDNISONE 20 MG/1
40 TABLET ORAL DAILY
Status: COMPLETED | OUTPATIENT
Start: 2024-05-03 | End: 2024-05-07

## 2024-05-03 RX ORDER — LEVOTHYROXINE SODIUM 0.07 MG/1
75 TABLET ORAL
Status: DISCONTINUED | OUTPATIENT
Start: 2024-05-03 | End: 2024-05-08 | Stop reason: HOSPADM

## 2024-05-03 RX ORDER — ONDANSETRON 4 MG/1
4 TABLET, ORALLY DISINTEGRATING ORAL EVERY 4 HOURS PRN
Status: DISCONTINUED | OUTPATIENT
Start: 2024-05-03 | End: 2024-05-08 | Stop reason: HOSPADM

## 2024-05-03 RX ORDER — POLYETHYLENE GLYCOL 3350 17 G/17G
1 POWDER, FOR SOLUTION ORAL
Status: DISCONTINUED | OUTPATIENT
Start: 2024-05-03 | End: 2024-05-08 | Stop reason: HOSPADM

## 2024-05-03 RX ORDER — ACETAMINOPHEN 325 MG/1
650 TABLET ORAL EVERY 6 HOURS PRN
Status: DISCONTINUED | OUTPATIENT
Start: 2024-05-03 | End: 2024-05-03

## 2024-05-03 RX ORDER — LEVALBUTEROL INHALATION SOLUTION 1.25 MG/3ML
1.25 SOLUTION RESPIRATORY (INHALATION)
Status: DISCONTINUED | OUTPATIENT
Start: 2024-05-03 | End: 2024-05-05

## 2024-05-03 RX ORDER — HYDROCODONE BITARTRATE AND ACETAMINOPHEN 10; 325 MG/1; MG/1
1 TABLET ORAL EVERY 4 HOURS PRN
Status: DISCONTINUED | OUTPATIENT
Start: 2024-05-03 | End: 2024-05-08 | Stop reason: HOSPADM

## 2024-05-03 RX ORDER — ACETAMINOPHEN 325 MG/1
650 TABLET ORAL EVERY 6 HOURS
Status: DISPENSED | OUTPATIENT
Start: 2024-05-03 | End: 2024-05-07

## 2024-05-03 RX ORDER — ZOLPIDEM TARTRATE 5 MG/1
10 TABLET ORAL NIGHTLY PRN
Status: DISCONTINUED | OUTPATIENT
Start: 2024-05-03 | End: 2024-05-03

## 2024-05-03 RX ORDER — DIPHENHYDRAMINE HCL 25 MG
25 TABLET ORAL EVERY 6 HOURS PRN
Status: DISCONTINUED | OUTPATIENT
Start: 2024-05-03 | End: 2024-05-08 | Stop reason: HOSPADM

## 2024-05-03 RX ORDER — OMEPRAZOLE 20 MG/1
20 CAPSULE, DELAYED RELEASE ORAL 2 TIMES DAILY
Status: DISCONTINUED | OUTPATIENT
Start: 2024-05-03 | End: 2024-05-08 | Stop reason: HOSPADM

## 2024-05-03 RX ORDER — CITALOPRAM 20 MG/1
20 TABLET ORAL DAILY
Status: DISCONTINUED | OUTPATIENT
Start: 2024-05-03 | End: 2024-05-03

## 2024-05-03 RX ORDER — SODIUM CHLORIDE, SODIUM LACTATE, POTASSIUM CHLORIDE, AND CALCIUM CHLORIDE .6; .31; .03; .02 G/100ML; G/100ML; G/100ML; G/100ML
500 INJECTION, SOLUTION INTRAVENOUS
Status: DISCONTINUED | OUTPATIENT
Start: 2024-05-03 | End: 2024-05-08 | Stop reason: HOSPADM

## 2024-05-03 RX ORDER — AMOXICILLIN 250 MG
2 CAPSULE ORAL EVERY EVENING
Status: DISCONTINUED | OUTPATIENT
Start: 2024-05-03 | End: 2024-05-08 | Stop reason: HOSPADM

## 2024-05-03 RX ORDER — SODIUM CHLORIDE 9 MG/ML
INJECTION, SOLUTION INTRAVENOUS CONTINUOUS
Status: DISCONTINUED | OUTPATIENT
Start: 2024-05-03 | End: 2024-05-03

## 2024-05-03 RX ORDER — HYDROMORPHONE HYDROCHLORIDE 1 MG/ML
0.5 INJECTION, SOLUTION INTRAMUSCULAR; INTRAVENOUS; SUBCUTANEOUS EVERY 4 HOURS PRN
Status: DISCONTINUED | OUTPATIENT
Start: 2024-05-03 | End: 2024-05-03

## 2024-05-03 RX ADMIN — HYDROCODONE BITARTRATE AND ACETAMINOPHEN 1 TABLET: 10; 325 TABLET ORAL at 15:06

## 2024-05-03 RX ADMIN — LEVALBUTEROL 1.25 MG: 1.25 SOLUTION RESPIRATORY (INHALATION) at 20:46

## 2024-05-03 RX ADMIN — OMEPRAZOLE 20 MG: 20 CAPSULE, DELAYED RELEASE ORAL at 16:27

## 2024-05-03 RX ADMIN — ACETAMINOPHEN 650 MG: 325 TABLET, FILM COATED ORAL at 12:54

## 2024-05-03 RX ADMIN — PREGABALIN 100 MG: 100 CAPSULE ORAL at 05:14

## 2024-05-03 RX ADMIN — MONTELUKAST 10 MG: 10 TABLET, FILM COATED ORAL at 20:31

## 2024-05-03 RX ADMIN — ACETAMINOPHEN 650 MG: 325 TABLET, FILM COATED ORAL at 23:56

## 2024-05-03 RX ADMIN — PREDNISONE 40 MG: 20 TABLET ORAL at 10:17

## 2024-05-03 RX ADMIN — KETOROLAC TROMETHAMINE 15 MG: 15 INJECTION, SOLUTION INTRAMUSCULAR; INTRAVENOUS at 23:56

## 2024-05-03 RX ADMIN — PREGABALIN 100 MG: 100 CAPSULE ORAL at 16:27

## 2024-05-03 RX ADMIN — ACETAMINOPHEN 650 MG: 325 TABLET, FILM COATED ORAL at 16:27

## 2024-05-03 RX ADMIN — HYDROCODONE BITARTRATE AND ACETAMINOPHEN 1 TABLET: 10; 325 TABLET ORAL at 20:32

## 2024-05-03 RX ADMIN — KETOROLAC TROMETHAMINE 15 MG: 15 INJECTION, SOLUTION INTRAMUSCULAR; INTRAVENOUS at 02:58

## 2024-05-03 RX ADMIN — FLUTICASONE FUROATE, UMECLIDINIUM BROMIDE AND VILANTEROL TRIFENATATE 1 PUFF: 100; 62.5; 25 POWDER RESPIRATORY (INHALATION) at 05:14

## 2024-05-03 RX ADMIN — OMEPRAZOLE 20 MG: 20 CAPSULE, DELAYED RELEASE ORAL at 05:14

## 2024-05-03 RX ADMIN — MONTELUKAST 10 MG: 10 TABLET, FILM COATED ORAL at 02:59

## 2024-05-03 RX ADMIN — HYDROMORPHONE HYDROCHLORIDE 0.5 MG: 1 INJECTION, SOLUTION INTRAMUSCULAR; INTRAVENOUS; SUBCUTANEOUS at 22:40

## 2024-05-03 RX ADMIN — HYDROCODONE BITARTRATE AND ACETAMINOPHEN 1 TABLET: 10; 325 TABLET ORAL at 10:17

## 2024-05-03 RX ADMIN — KETOROLAC TROMETHAMINE 15 MG: 15 INJECTION, SOLUTION INTRAMUSCULAR; INTRAVENOUS at 12:53

## 2024-05-03 RX ADMIN — ACETAMINOPHEN 650 MG: 325 TABLET, FILM COATED ORAL at 03:00

## 2024-05-03 RX ADMIN — HYDROMORPHONE HYDROCHLORIDE 0.5 MG: 1 INJECTION, SOLUTION INTRAMUSCULAR; INTRAVENOUS; SUBCUTANEOUS at 05:12

## 2024-05-03 RX ADMIN — SODIUM CHLORIDE: 9 INJECTION, SOLUTION INTRAVENOUS at 03:46

## 2024-05-03 ASSESSMENT — PATIENT HEALTH QUESTIONNAIRE - PHQ9
9. THOUGHTS THAT YOU WOULD BE BETTER OFF DEAD, OR OF HURTING YOURSELF: NOT AT ALL
6. FEELING BAD ABOUT YOURSELF - OR THAT YOU ARE A FAILURE OR HAVE LET YOURSELF OR YOUR FAMILY DOWN: NOT AL ALL
5. POOR APPETITE OR OVEREATING: NOT AT ALL
SUM OF ALL RESPONSES TO PHQ QUESTIONS 1-9: 10
4. FEELING TIRED OR HAVING LITTLE ENERGY: NEARLY EVERY DAY
7. TROUBLE CONCENTRATING ON THINGS, SUCH AS READING THE NEWSPAPER OR WATCHING TELEVISION: SEVERAL DAYS
2. FEELING DOWN, DEPRESSED, IRRITABLE, OR HOPELESS: SEVERAL DAYS
8. MOVING OR SPEAKING SO SLOWLY THAT OTHER PEOPLE COULD HAVE NOTICED. OR THE OPPOSITE, BEING SO FIGETY OR RESTLESS THAT YOU HAVE BEEN MOVING AROUND A LOT MORE THAN USUAL: SEVERAL DAYS
SUM OF ALL RESPONSES TO PHQ9 QUESTIONS 1 AND 2: 2
3. TROUBLE FALLING OR STAYING ASLEEP OR SLEEPING TOO MUCH: NEARLY EVERY DAY
1. LITTLE INTEREST OR PLEASURE IN DOING THINGS: SEVERAL DAYS

## 2024-05-03 ASSESSMENT — ENCOUNTER SYMPTOMS
FALLS: 1
FEVER: 0
DIZZINESS: 0
NAUSEA: 0
HEADACHES: 0
DOUBLE VISION: 0
COUGH: 0
BRUISES/BLEEDS EASILY: 0
PALPITATIONS: 0
ABDOMINAL PAIN: 0
DEPRESSION: 0
FOCAL WEAKNESS: 0
SHORTNESS OF BREATH: 1
VOMITING: 0
BLURRED VISION: 0
WEAKNESS: 1
HEARTBURN: 0
MYALGIAS: 1
CHILLS: 0

## 2024-05-03 ASSESSMENT — COGNITIVE AND FUNCTIONAL STATUS - GENERAL
SUGGESTED CMS G CODE MODIFIER DAILY ACTIVITY: CK
TOILETING: A LITTLE
DRESSING REGULAR LOWER BODY CLOTHING: A LITTLE
HELP NEEDED FOR BATHING: A LOT
MOVING FROM LYING ON BACK TO SITTING ON SIDE OF FLAT BED: A LITTLE
PERSONAL GROOMING: A LITTLE
MOVING TO AND FROM BED TO CHAIR: A LITTLE
WALKING IN HOSPITAL ROOM: A LITTLE
MOBILITY SCORE: 17
SUGGESTED CMS G CODE MODIFIER MOBILITY: CK
CLIMB 3 TO 5 STEPS WITH RAILING: A LOT
TURNING FROM BACK TO SIDE WHILE IN FLAT BAD: A LITTLE
STANDING UP FROM CHAIR USING ARMS: A LITTLE
DAILY ACTIVITIY SCORE: 17
DRESSING REGULAR UPPER BODY CLOTHING: A LOT

## 2024-05-03 ASSESSMENT — PAIN DESCRIPTION - PAIN TYPE
TYPE: ACUTE PAIN
TYPE: ACUTE PAIN;SURGICAL PAIN
TYPE: ACUTE PAIN

## 2024-05-03 ASSESSMENT — GAIT ASSESSMENTS
GAIT LEVEL OF ASSIST: MINIMAL ASSIST
ASSISTIVE DEVICE: HAND HELD ASSIST
DEVIATION: BRADYKINETIC;SHUFFLED GAIT;DECREASED HEEL STRIKE;DECREASED TOE OFF
DISTANCE (FEET): 30

## 2024-05-03 ASSESSMENT — COPD QUESTIONNAIRES
DURING THE PAST 4 WEEKS HOW MUCH DID YOU FEEL SHORT OF BREATH: SOME OF THE TIME
DO YOU EVER COUGH UP ANY MUCUS OR PHLEGM?: YES, A FEW DAYS A WEEK OR MONTH
HAVE YOU SMOKED AT LEAST 100 CIGARETTES IN YOUR ENTIRE LIFE: YES
COPD SCREENING SCORE: 6

## 2024-05-03 ASSESSMENT — LIFESTYLE VARIABLES
AVERAGE NUMBER OF DAYS PER WEEK YOU HAVE A DRINK CONTAINING ALCOHOL: 0
TOTAL SCORE: 0
TOTAL SCORE: 0
HOW MANY TIMES IN THE PAST YEAR HAVE YOU HAD 5 OR MORE DRINKS IN A DAY: 0
ON A TYPICAL DAY WHEN YOU DRINK ALCOHOL HOW MANY DRINKS DO YOU HAVE: 0
ALCOHOL_USE: NO
SUBSTANCE_ABUSE: 0
HAVE PEOPLE ANNOYED YOU BY CRITICIZING YOUR DRINKING: NO
HAVE YOU EVER FELT YOU SHOULD CUT DOWN ON YOUR DRINKING: NO
EVER HAD A DRINK FIRST THING IN THE MORNING TO STEADY YOUR NERVES TO GET RID OF A HANGOVER: NO
EVER FELT BAD OR GUILTY ABOUT YOUR DRINKING: NO
TOTAL SCORE: 0
CONSUMPTION TOTAL: NEGATIVE

## 2024-05-03 ASSESSMENT — ACTIVITIES OF DAILY LIVING (ADL): TOILETING: INDEPENDENT

## 2024-05-03 ASSESSMENT — FIBROSIS 4 INDEX: FIB4 SCORE: 3.39

## 2024-05-03 NOTE — PROGRESS NOTES
4 Eyes Skin Assessment Completed by Savage RN and KYA Mooney.    Head WDL  Ears WDL  Nose WDL  Mouth WDL  Neck WDL  Breast/Chest WDL  Shoulder Blades WDL  Spine Redness and Blanching  (R) Arm/Elbow/Hand Bruising and Scar  (L) Arm/Elbow/Hand Bruising  Abdomen WDL  Groin WDL  Scrotum/Coccyx/Buttocks Redness and Blanching  (R) Leg Scab in the shin, old surgical incision scar  (L) Leg Bruising in the shin  (R) Heel/Foot/Toe WDL  (L) Heel/Foot/Toe WDL          Devices In Places Blood Pressure Cuff, Pulse Ox, and Nasal Cannula      Interventions In Place NC W/Ear Foams and Pillows    Possible Skin Injury No    Pictures Uploaded Into Epic N/A  Wound Consult Placed N/A  RN Wound Prevention Protocol Ordered No

## 2024-05-03 NOTE — THERAPY
"Physical Therapy   Initial Evaluation     Patient Name: Isa Bowser Cirac  Age:  83 y.o., Sex:  female  Medical Record #: [id number]  Today's Date: 5/3/2024     Precautions  Precautions: Fall Risk;Non Weight Bearing Right Upper Extremity  Comments: R proximal humerus fx, ROMAT    Assessment  Patient is an 83 y.o. female who was admitted after a fall and diagnosed with a R periprosthetic proximal humerus fx. PMH significant for HTN, COPD, neuropathy.    Pt received in bed and agreeable to PT evaluation. Pt presents with impaired functional mobility secondary to pain in her RUE with movement and fear of falling. Pt required min A overall to mobilize to EOB, STS, and ambulate a short distance. Due to fear of falling, had a 2nd person CGA for pt comfort and safety. Pt provided with education on importance of gentle progressive ROM to prevent frozen shoulder, importance of mobilization with nursing staff multiple times a day, and recommendations for further rehab prior to return home. Will follow for acute PT.    Plan    Physical Therapy Initial Treatment Plan   Treatment Plan : Bed Mobility, Equipment, Gait Training, Neuro Re-Education / Balance, Self Care / Home Evaluation, Stair Training, Therapeutic Activities, Therapeutic Exercise  Treatment Frequency: 4 Times per Week  Duration: Until Therapy Goals Met    DC Equipment Recommendations: Unable to determine at this time  Discharge Recommendations: Recommend post-acute placement for additional physical therapy services prior to discharge home     Subjective    \"I'm afraid\"     Objective       05/03/24 1406   Precautions   Precautions Fall Risk;Non Weight Bearing Right Upper Extremity   Comments R proximal humerus fx, ROMAT   Pain 0 - 10 Group   Therapist Pain Assessment During Activity;Nurse Notified  (R arm pain, tolerable for activity, RN aware)   Prior Living Situation   Prior Services Home-Independent   Housing / Facility 1 Story House   Steps Into Home 4   Steps In " Home 0   Rail Both Rail (Steps into Home)   Equipment Owned Single Point Cane   Lives with - Patient's Self Care Capacity Alone and Able to Care For Self   Comments Pt receives assist from local friends for home care and meals.   Prior Level of Functional Mobility   Bed Mobility Independent   Transfer Status Independent   Ambulation Independent   Ambulation Distance community distances   Assistive Devices Used Single Point Cane   Stairs Independent   History of Falls   History of Falls Yes   Date of Last Fall   (reason for admission)   Cognition    Level of Consciousness Alert   Comments Pleasant and cooperative. Very fearful of falling with mobility   Passive ROM Lower Body   Passive ROM Lower Body WDL   Active ROM Lower Body    Active ROM Lower Body  WDL   Strength Lower Body   Comments BLE grossly 4/5   Sensation Lower Body   Comments Baseline neuropathy in B feet   Lower Body Muscle Tone   Lower Body Muscle Tone  WDL   Coordination Lower Body    Coordination Lower Body  WDL   Balance Assessment   Sitting Balance (Static) Fair   Sitting Balance (Dynamic) Fair   Standing Balance (Static) Poor -   Standing Balance (Dynamic) Trace +   Weight Shift Sitting Fair   Weight Shift Standing Fair   Comments LUE support for ambulation and standing, pt fearful   Bed Mobility    Supine to Sit Minimal Assist   Sit to Supine Minimal Assist   Scooting Minimal Assist   Comments HOB elevated, cues for technique   Gait Analysis   Gait Level Of Assist Minimal Assist   Assistive Device Hand Held Assist   Distance (Feet) 30   # of Times Distance was Traveled 1   Deviation Bradykinetic;Shuffled Gait;Decreased Heel Strike;Decreased Toe Off   Functional Mobility   Sit to Stand Minimal Assist   Bed, Chair, Wheelchair Transfer Minimal Assist   Transfer Method Stand Step   Mobility up with HHA   6 Clicks Assessment - How much HELP from from another person do you currently need... (If the patient hasn't done an activity recently, how much  help from another person do you think he/she would need if he/she tried?)   Turning from your back to your side while in a flat bed without using bedrails? 3   Moving from lying on your back to sitting on the side of a flat bed without using bedrails? 3   Moving to and from a bed to a chair (including a wheelchair)? 3   Standing up from a chair using your arms (e.g., wheelchair, or bedside chair)? 3   Walking in hospital room? 3   Climbing 3-5 steps with a railing? 2   6 clicks Mobility Score 17   Short Term Goals    Short Term Goal # 1 Pt will perform bed mobility with supervision from a flat bed to progress function in 6 visits.   Short Term Goal # 2 Pt will transfer with LRAD and supervision to progress function in 6 visits.   Short Term Goal # 3 Pt will ambulate 150ft with LRAD and supervision to progress to household ambulation in 6 visits.   Short Term Goal # 4 Pt will negotiate 4 stairs with L hand rail and supervision for home entry in 6 visits.   Education Group   Education Provided Role of Physical Therapist   Role of Physical Therapist Patient Response Patient;Family;Acceptance;Explanation;Verbal Demonstration   Physical Therapy Initial Treatment Plan    Treatment Plan  Bed Mobility;Equipment;Gait Training;Neuro Re-Education / Balance;Self Care / Home Evaluation;Stair Training;Therapeutic Activities;Therapeutic Exercise   Treatment Frequency 4 Times per Week   Duration Until Therapy Goals Met   Problem List    Problems Impaired Bed Mobility;Impaired Transfers;Impaired Ambulation;Impaired Coordination;Decreased Activity Tolerance   Anticipated Discharge Equipment and Recommendations   DC Equipment Recommendations Unable to determine at this time   Discharge Recommendations Recommend post-acute placement for additional physical therapy services prior to discharge home   Interdisciplinary Plan of Care Collaboration   IDT Collaboration with  Nursing;Occupational Therapist;Family / Caregiver   Patient Position  at End of Therapy In Bed;Call Light within Reach;Phone within Reach;Tray Table within Reach;Family / Friend in Room   Collaboration Comments RN updated   Session Information   Date / Session Number  5/3-1 (1/4, 5/9)

## 2024-05-03 NOTE — CARE PLAN
Problem: Knowledge Deficit - Standard  Goal: Patient and family/care givers will demonstrate understanding of plan of care, disease process/condition, diagnostic tests and medications  Outcome: Progressing     Problem: Knowledge Deficit - COPD  Goal: Patient/significant other demonstrates understanding of disease process, utilization of the Action Plan, medications and discharge instruction  Outcome: Progressing     Problem: Risk for Infection - COPD  Goal: Patient will remain free from signs and symptoms of infection  Outcome: Progressing     Problem: Nutrition - Advanced  Goal: Patient will display progressive weight gain toward goal have adequate food and fluid intake  Outcome: Progressing     Problem: Ineffective Airway Clearance  Goal: Patient will maintain patent airway with clear/clearing breath sounds  Outcome: Progressing     Problem: Impaired Gas Exchange  Goal: Patient will demonstrate improved ventilation and adequate oxygenation and participate in treatment regimen within the level of ability/situation.  Outcome: Progressing     Problem: Risk for Aspiration  Goal: Patient's risk for aspiration will be absent or decrease  Outcome: Progressing     Problem: Self Care  Goal: Patient will have the ability to perform ADLs independently or with assistance (bathe, groom, dress, toilet and feed)  Outcome: Progressing     Problem: Hemodynamics  Goal: Patient's hemodynamics, fluid balance and neurologic status will be stable or improve  Outcome: Progressing     Problem: Fluid Volume  Goal: Fluid volume balance will be maintained  Outcome: Progressing     Problem: Urinary - Renal Perfusion  Goal: Ability to achieve and maintain adequate renal perfusion and functioning will improve  Outcome: Progressing     Problem: Respiratory  Goal: Patient will achieve/maintain optimum respiratory ventilation and gas exchange  Outcome: Progressing     Problem: Mechanical Ventilation  Goal: Safe management of artificial airway  and ventilation  Outcome: Progressing  Goal: Successful weaning off mechanical ventilator, spontaneously maintains adequate gas exchange  Outcome: Progressing  Goal: Patient will be able to express needs and understand communication  Outcome: Progressing     Problem: Physical Regulation  Goal: Diagnostic test results will improve  Outcome: Progressing  Goal: Signs and symptoms of infection will decrease  Outcome: Progressing     Problem: Pain - Standard  Goal: Alleviation of pain or a reduction in pain to the patient’s comfort goal  Outcome: Progressing     Problem: Skin Integrity  Goal: Skin integrity is maintained or improved  Outcome: Progressing   The patient is Stable - Low risk of patient condition declining or worsening    Shift Goals  Clinical Goals: Pain management, safety, discharge needs  Patient Goals: Discharge needs, pain management  Family Goals: Discharge needs, pain management    Progress made toward(s) clinical / shift goals:      Patient is not progressing towards the following goals:

## 2024-05-03 NOTE — PROGRESS NOTES
[facility] DIRECT ADMISSION REPORT  Transferring facility: [facility]  Transferring physician: Dr. Jones    Chief complaint: Right Humeral Head fracture above hardware.  Will need higher level of care for orthopedic consult  Pertinent history & patient course: 83-year-old female, smoker, history of HTN, Hypothyroidism, COPD (not oxygen dependent) and not on blood thinners.  Had a fall yesterday and CT showing right humeral head fracture above hardware.  Her other scans including head are normal.  She is not on any blood thinners.  Lives alone and has significant increased pain.  Pertinent imaging & lab results: Unremarkable with hemodynamically stable vital signs  Consultants called prior to transfer and pertinent input from consultants: This case was discussed with Dr. Higgins who will evaluate patient as a consult.  Timing of surgery to be determined by orthopedic team.  Code Status: Full code per transferring provider, I personally verified with the transferring provider patient's code status and the transferring provider has confirmed this with the patient.  Reason for Transfer: Need for orthopedic surgery consult  Further work up or recommendations requested prior to transfer: No    Patient accepted for transfer: Yes  Accepting [facility] Facility: [facility] - Nursing to notify the Triage Coordinator in the RTOC via Voalte or Phone ext. [phone] when patient arrives to the unit. The Triage Coordinator will assign the admitting provider.    *[facility] is at capacity at this time    Consultants to be called upon arrival: Orthopedic surgery  Admission status: Inpatient.   Floor requested: [place]      The admitting provider is the point of contact for questions or concerns regarding patient's care.

## 2024-05-03 NOTE — CARE PLAN
The patient is Stable - Low risk of patient condition declining or worsening    Shift Goals  Clinical Goals: safety, pain control  Patient Goals: rest, comfort  Family Goals: no family present    Progress made toward(s) clinical / shift goals:    Problem: Knowledge Deficit - Standard  Goal: Patient and family/care givers will demonstrate understanding of plan of care, disease process/condition, diagnostic tests and medications  Outcome: Progressing    Educate the patient regarding her plan of care.     Problem: Pain - Standard  Goal: Alleviation of pain or a reduction in pain to the patient’s comfort goal  Outcome: Progressing    Administer pain medications per MAR.       Patient is not progressing towards the following goals:

## 2024-05-03 NOTE — DISCHARGE PLANNING
Case Management Discharge Planning    Admission Date: 5/3/2024  GMLOS: 2.8  ALOS: 0    6-Clicks ADL Score:    6-Clicks Mobility Score:    PT and/or OT Eval ordered: No  Post-acute Referrals Ordered: No  Post-acute Choice Obtained: No  Has referral(s) been sent to post-acute provider:  No      Anticipated Discharge Dispo:      DME Needed: No    Action(s) Taken: LMSW provided pt with code 44 notice and pt verbalized understanding. LMSW also provided pt with resources as recommended by trauma screen protocol.    Escalations Completed: None    Medically Clear: No    Next Steps: LMSW to remain available for any additional CM needs.    Barriers to Discharge: Medical clearance    Is the patient up for discharge tomorrow: No

## 2024-05-03 NOTE — PROGRESS NOTES
X-rays show a new acute right proximal humerus fracture involving the greater tuberosity.  She also has signs of distant injury to the right humerus that was treated with ORIF as well as a previous right AC joint injury.    Plan:  Would continue to treat the proximal humerus fracture nonoperatively at this time.  Passive and active assistive range of motion  PT/OT  Discharge to postacute care if needed  Follow-up in clinic in 1 to 2 weeks for repeat x-rays

## 2024-05-03 NOTE — PROGRESS NOTES
Patient seen and examined at bedside.  Please refer to H&P for details    This is a 83 female with past medical history of hypertension, hypothyroidism, psychiatric disorder, COPD on 2 L oxygen referred from [facility] with complicated fracture involving right humeral head for orthopedic evaluation.  Orthopedic recommending nonoperative management with outpatient follow-up in 1 to 2 weeks for repeat x-ray      On exam noted to have decreased range of motion in right shoulder due to pain.  Labs with mild leukocytosis, elevated CPK.  Imaging reviewed    Noted to have diffuse wheezing on exam        Nonsurgical management  On IV narcotics for pain management  Started on COPD protocol.  DuoNeb, steroid  Check procalcitonin if elevated will add azithromycin for 3 days  Repeat CBC in a.m. to monitor white count  Monitor for respiratory toxicity while on IV narcotics  Monitor oxygen requirement  PT/OT evaluation    I had extensive discussion with patient's daughter regarding the current medical status, treatment plan and prognosis .she was briefed on the patient's condition including recent changes or developments .  Updated with recent test results vital signs and symptoms.  Current treatment plan was explained in details including medications, therapies and any upcoming procedure or intervention.  Discussed the patient prognosis  based on the condition response to treatment and any potential complication or risk.  The family was given the opportunity to ask questions.

## 2024-05-03 NOTE — PROGRESS NOTES
Medium arm sling placed on pt RUE. Pt tolerating dme well at this time    Contact traction for any questions or concerns

## 2024-05-03 NOTE — DISCHARGE PLANNING
Patient rolled back to observation status per attending MD determination, Dr. Danial Perkins, and UR committee MD secondary review, Dr. Rebecca Hanson.  Condition Code 44 completed.
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
## 2024-05-03 NOTE — RESPIRATORY CARE
COPD EDUCATION by COPD CLINICAL EDUCATOR  5/3/2024 at 12:25 PM by Kimmie Koroma, RRT     Patient discussed with [facility] Pulmonary -Dr Owens  per orderset usage. Patient is not in exacerbation and can be assessed and treated with RT protocol pathways as appropriate. Contacted RT.

## 2024-05-03 NOTE — ASSESSMENT & PLAN NOTE
S/p GLF    From [facility]:  X-ray right shoulder 3 views: Somewhat comminuted fracture involving the right humeral head greater tuberosity, although nonunited fracture of the lateral right clavicle  Supportive pain control  PT/OT  Orthopedic recommending nonoperative management with outpatient follow-up in 1 to 2 weeks for repeat x-ray

## 2024-05-03 NOTE — H&P
Hospital Medicine History & Physical Note    Date of Service  5/3/2024    Primary Care Physician  Pcp Pt States None    Consultants  Orthopedic (Dr. Higgins)    Code Status  DNAR/DNI    Chief Complaint  No chief complaint on file.  Fall, right upper extremity pain    History of Presenting Illness  Isa Maravilla is a 83 y.o. female who presented 5/3/2024 as direct from [facility] for evaluation of right humeral head fracture, needing orthopedic evaluation. Her PMH includes COPD dependent on 2 L, tobacco abuse, hypothyroidism, hypertension, neuropathy, depression, chronic pain syndrome, prior RUE orthopedic procedure 1999. Patient reported having ground-level fall approximate 2 PM on 5/2.    Workup from [facility], ER included:  CT head: no acute intracranial abnormalities identified.  Borderline diffuse cerebral volume loss.  Mild nonspecific decreased attenuation of the periventricular white matter which is often secondary to microangiopathic ischemia    CT C-spine: Loss of the normal cervical lordosis, which can be secondary to patient's positioning and/or muscular spasm, status post anterior fusion at C4-C7 levels which is stable compared to prior exam.  No evidence of acute fracture of the cervical spine    X-ray right shoulder 3 views: Somewhat comminuted fracture involving the right humeral head greater tuberosity, although nonunited fracture of the lateral right clavicle    Patient was seen by me at [facility] arrival, admitted to medicine service for further evaluation treatment.  I have notified on-call orthopedic surgery, follow-up appreciated.    I discussed the plan of care with patient, bedside RN, pharmacy, and orthopedics.    Review of Systems  Review of Systems   Constitutional:  Negative for chills and fever.   HENT:  Negative for hearing loss and tinnitus.    Eyes:  Negative for blurred vision and double vision.   Respiratory:  Positive for shortness of breath. Negative for cough.     Cardiovascular:  Negative for chest pain and palpitations.   Gastrointestinal:  Negative for abdominal pain, heartburn, nausea and vomiting.   Genitourinary:  Negative for dysuria and urgency.   Musculoskeletal:  Positive for falls, joint pain and myalgias.   Skin:  Negative for itching and rash.   Neurological:  Positive for weakness. Negative for dizziness, focal weakness and headaches.   Endo/Heme/Allergies:  Negative for environmental allergies. Does not bruise/bleed easily.   Psychiatric/Behavioral:  Negative for depression and substance abuse.    All other systems reviewed and are negative.      Past Medical History   has a past medical history of Arthritis, Asthma, Bowel habit changes, Breath shortness, Cancer ([facility]), Cataract, COPD, Glaucoma, History of shingles, Hypertension, Hypothyroidism, Pain, Pneumonia (2012), Psychiatric disorder, and Stroke (HCC).    Surgical History   has a past surgical history that includes other orthopedic surgery; other; other; gyn surgery; and thyroidectomy total (1998).     Family History  family history is not on file.   Family history reviewed with patient. There is no family history that is pertinent to the chief complaint.     Social History   reports that she has been smoking cigarettes. She has never used smokeless tobacco. She reports current alcohol use. She reports current drug use.    Allergies  Allergies   Allergen Reactions    Ciprofloxacin Hives    Percocet [Apap-Fd&C Red #40 [place]-Oxycodone] Rash     Blisters    Prozac [Fluoxetine] Unspecified     .    Shellfish-Derived Products Unspecified     .    Sulfa Drugs Unspecified     .    Tape Unspecified     Paper tape ok    Ciprofloxacin Hives    Percodan [Oxycodone-Aspirin]     Prozac     Shellfish Allergy     Sulfa Drugs     Tape      Taper tape ok       Medications  Cannot display prior to admission medications because the patient has not been admitted in this contact.       Physical Exam  Temp:  [36.3 °C (97.3  "°F)] 36.3 °C (97.3 °F)  Pulse:  [89] 89  Resp:  [16] 16  BP: (122)/(83) 122/83  SpO2:  [95 %] 95 %  Blood Pressure : 122/83   Temperature: 36.3 °C (97.3 °F)   Pulse: 89   Respiration: 16   Pulse Oximetry: 95 %       Physical Exam  Vitals and nursing note reviewed.   Constitutional:       General: She is not in acute distress.  HENT:      Head: Normocephalic.      Nose: Nose normal.      Mouth/Throat:      Mouth: Mucous membranes are moist.      Pharynx: Oropharynx is clear.   Eyes:      General: No scleral icterus.     Extraocular Movements: Extraocular movements intact.   Cardiovascular:      Rate and Rhythm: Normal rate and regular rhythm.      Pulses: Normal pulses.      Heart sounds:      No friction rub.   Pulmonary:      Effort: No respiratory distress.      Breath sounds: No stridor. Wheezing and rales present.   Chest:      Chest wall: No tenderness.   Abdominal:      General: There is no distension.      Tenderness: There is no abdominal tenderness. There is no guarding or rebound.   Musculoskeletal:         General: Tenderness (RUE) and signs of injury (RUE) present.      Cervical back: Neck supple. No tenderness.      Right lower leg: No edema.      Left lower leg: No edema.      Comments: RUE - decreased ROM, tender to touch - hand, forearm, elbow   Skin:     General: Skin is warm and dry.      Capillary Refill: Capillary refill takes less than 2 seconds.   Neurological:      General: No focal deficit present.      Mental Status: She is alert and oriented to person, place, and time.   Psychiatric:         Mood and Affect: Mood normal.         Laboratory:          No results for input(s): \"ALTSGPT\", \"ASTSGOT\", \"ALKPHOSPHAT\", \"TBILIRUBIN\", \"DBILIRUBIN\", \"GAMMAGT\", \"AMYLASE\", \"LIPASE\", \"ALB\", \"PREALBUMIN\", \"GLUCOSE\" in the last 72 hours.      No results for input(s): \"NTPROBNP\" in the last 72 hours.      No results for input(s): \"TROPONINT\" in the last 72 hours.    Imaging:  DX-CHEST-LIMITED (1 VIEW)    " (Results Pending)       X-Ray:  I have personally reviewed the images and compared with prior images.  EKG:  I have personally reviewed the images and compared with prior images.    Assessment/Plan:  Justification for Admission Status  I anticipate this patient will require at least 2 midnights hospitalization, therefore appropriate for inpatient status.        * Humeral head fracture, right, closed, initial encounter  Assessment & Plan  S/p GLF    From [facility]:  X-ray right shoulder 3 views: Somewhat comminuted fracture involving the right humeral head greater tuberosity, although nonunited fracture of the lateral right clavicle    Supportive pain control  PT/OT  Orthopedic f/u appreciated    Diffuse pain in right upper extremity  Assessment & Plan  Likely due to above  Supportive pain control    Neuropathy  Assessment & Plan  Lyrica    COPD (chronic obstructive pulmonary disease) (HCC)- (present on admission)  Assessment & Plan  Continue home regimen: Trelegy Ellipta  As needed DuoNebs  Smoking cessation advised    Hypothyroidism- (present on admission)  Assessment & Plan  Synthroid    Hypertension- (present on admission)  Assessment & Plan  Lisinopril    GERD (gastroesophageal reflux disease)  Assessment & Plan  Omeprazole    Insomnia  Assessment & Plan  As needed Ambien    ACP (advance care planning)  Assessment & Plan  Goal care discussed with patient in T3.  She stated she does not wish to have aggressive/heroic life-sustaining measures-absolutely no CPR/defibrillation/intubation or mechanical ventilation.  She is however agreeable for minimally invasive medical management, supportive pain control, as well as possible orthopedic surgical intervention if warranted.  Diagnosis, prognosis, question and concern addressed.  DNR/denies status confirmed.  ACP: 16 minutes    Tobacco abuse- (present on admission)  Assessment & Plan  Admits to smoking 1 pack of cigarettes daily  Cessation counseling provided: 4  minutes  We discussed tobacco's smoking cessation given concern for COPD, she expressed understanding.  She states she does not wish to quit as she is very old already.  Offered nicotine patch, nicotine gum, Chantix as alternative-she declined.  Provided patient with standard tobacco cessation information per protocol    Depression with anxiety- (present on admission)  Assessment & Plan  Celexa        VTE prophylaxis: SCDs/TEDs

## 2024-05-04 PROBLEM — J44.0 ACUTE BRONCHITIS WITH CHRONIC OBSTRUCTIVE PULMONARY DISEASE (COPD) (HCC): Status: ACTIVE | Noted: 2023-11-29

## 2024-05-04 PROBLEM — J20.9 ACUTE BRONCHITIS WITH CHRONIC OBSTRUCTIVE PULMONARY DISEASE (COPD) (HCC): Status: ACTIVE | Noted: 2023-11-29

## 2024-05-04 PROBLEM — D72.829 LEUCOCYTOSIS: Status: ACTIVE | Noted: 2024-05-04

## 2024-05-04 PROBLEM — J44.1 CHRONIC OBSTRUCTIVE PULMONARY DISEASE WITH ACUTE EXACERBATION (HCC): Status: ACTIVE | Noted: 2023-11-29

## 2024-05-04 LAB
BASOPHILS # BLD AUTO: 0.2 % (ref 0–1.8)
BASOPHILS # BLD: 0.02 K/UL (ref 0–0.12)
CK SERPL-CCNC: 262 U/L (ref 0–154)
EOSINOPHIL # BLD AUTO: 0 K/UL (ref 0–0.51)
EOSINOPHIL NFR BLD: 0 % (ref 0–6.9)
ERYTHROCYTE [DISTWIDTH] IN BLOOD BY AUTOMATED COUNT: 48.2 FL (ref 35.9–50)
HCT VFR BLD AUTO: 36 % (ref 37–47)
HGB BLD-MCNC: 11.7 G/DL (ref 12–16)
IMM GRANULOCYTES # BLD AUTO: 0.04 K/UL (ref 0–0.11)
IMM GRANULOCYTES NFR BLD AUTO: 0.3 % (ref 0–0.9)
LYMPHOCYTES # BLD AUTO: 2.25 K/UL (ref 1–4.8)
LYMPHOCYTES NFR BLD: 17.2 % (ref 22–41)
MCH RBC QN AUTO: 31 PG (ref 27–33)
MCHC RBC AUTO-ENTMCNC: 32.5 G/DL (ref 32.2–35.5)
MCV RBC AUTO: 95.2 FL (ref 81.4–97.8)
MONOCYTES # BLD AUTO: 0.85 K/UL (ref 0–0.85)
MONOCYTES NFR BLD AUTO: 6.5 % (ref 0–13.4)
NEUTROPHILS # BLD AUTO: 9.93 K/UL (ref 1.82–7.42)
NEUTROPHILS NFR BLD: 75.8 % (ref 44–72)
NRBC # BLD AUTO: 0 K/UL
NRBC BLD-RTO: 0 /100 WBC (ref 0–0.2)
PLATELET # BLD AUTO: 186 K/UL (ref 164–446)
PMV BLD AUTO: 11.1 FL (ref 9–12.9)
RBC # BLD AUTO: 3.78 M/UL (ref 4.2–5.4)
WBC # BLD AUTO: 13.1 K/UL (ref 4.8–10.8)

## 2024-05-04 PROCEDURE — 99233 SBSQ HOSP IP/OBS HIGH 50: CPT | Performed by: STUDENT IN AN ORGANIZED HEALTH CARE EDUCATION/TRAINING PROGRAM

## 2024-05-04 RX ORDER — ENOXAPARIN SODIUM 100 MG/ML
40 INJECTION SUBCUTANEOUS DAILY
Status: DISCONTINUED | OUTPATIENT
Start: 2024-05-04 | End: 2024-05-08 | Stop reason: HOSPADM

## 2024-05-04 RX ORDER — CITALOPRAM 20 MG/1
40 TABLET ORAL EVERY MORNING
Status: DISCONTINUED | OUTPATIENT
Start: 2024-05-04 | End: 2024-05-08 | Stop reason: HOSPADM

## 2024-05-04 RX ORDER — ZOLPIDEM TARTRATE 5 MG/1
5 TABLET ORAL NIGHTLY PRN
Status: DISCONTINUED | OUTPATIENT
Start: 2024-05-04 | End: 2024-05-08 | Stop reason: HOSPADM

## 2024-05-04 RX ADMIN — HYDROCODONE BITARTRATE AND ACETAMINOPHEN 1 TABLET: 10; 325 TABLET ORAL at 11:22

## 2024-05-04 RX ADMIN — PREDNISONE 40 MG: 20 TABLET ORAL at 04:25

## 2024-05-04 RX ADMIN — PREGABALIN 100 MG: 100 CAPSULE ORAL at 16:49

## 2024-05-04 RX ADMIN — CITALOPRAM HYDROBROMIDE 40 MG: 20 TABLET ORAL at 09:05

## 2024-05-04 RX ADMIN — FLUTICASONE FUROATE, UMECLIDINIUM BROMIDE AND VILANTEROL TRIFENATATE 1 PUFF: 100; 62.5; 25 POWDER RESPIRATORY (INHALATION) at 04:28

## 2024-05-04 RX ADMIN — HYDROCODONE BITARTRATE AND ACETAMINOPHEN 1 TABLET: 10; 325 TABLET ORAL at 07:21

## 2024-05-04 RX ADMIN — OMEPRAZOLE 20 MG: 20 CAPSULE, DELAYED RELEASE ORAL at 16:49

## 2024-05-04 RX ADMIN — HYDROCODONE BITARTRATE AND ACETAMINOPHEN 1 TABLET: 10; 325 TABLET ORAL at 16:49

## 2024-05-04 RX ADMIN — OMEPRAZOLE 20 MG: 20 CAPSULE, DELAYED RELEASE ORAL at 04:25

## 2024-05-04 RX ADMIN — PREGABALIN 100 MG: 100 CAPSULE ORAL at 04:23

## 2024-05-04 RX ADMIN — HYDROCODONE BITARTRATE AND ACETAMINOPHEN 1 TABLET: 10; 325 TABLET ORAL at 20:53

## 2024-05-04 RX ADMIN — ACETAMINOPHEN 650 MG: 325 TABLET, FILM COATED ORAL at 04:23

## 2024-05-04 RX ADMIN — KETOROLAC TROMETHAMINE 15 MG: 15 INJECTION, SOLUTION INTRAMUSCULAR; INTRAVENOUS at 04:27

## 2024-05-04 RX ADMIN — SENNOSIDES AND DOCUSATE SODIUM 2 TABLET: 50; 8.6 TABLET ORAL at 16:49

## 2024-05-04 RX ADMIN — ACETAMINOPHEN 650 MG: 325 TABLET, FILM COATED ORAL at 16:49

## 2024-05-04 RX ADMIN — LEVALBUTEROL 1.25 MG: 1.25 SOLUTION RESPIRATORY (INHALATION) at 08:00

## 2024-05-04 RX ADMIN — LEVOTHYROXINE SODIUM 75 MCG: 0.07 TABLET ORAL at 07:00

## 2024-05-04 RX ADMIN — ENOXAPARIN SODIUM 40 MG: 100 INJECTION SUBCUTANEOUS at 16:50

## 2024-05-04 RX ADMIN — MONTELUKAST 10 MG: 10 TABLET, FILM COATED ORAL at 20:55

## 2024-05-04 RX ADMIN — KETOROLAC TROMETHAMINE 15 MG: 15 INJECTION, SOLUTION INTRAMUSCULAR; INTRAVENOUS at 11:22

## 2024-05-04 ASSESSMENT — PAIN DESCRIPTION - PAIN TYPE
TYPE: ACUTE PAIN

## 2024-05-04 NOTE — FLOWSHEET NOTE
RESPIRATORY    Changed to Xopenex medication per protocol due to patient complaint of shakiness  Continue Q4 per MD order reassess in am     05/03/24 1700   Protocol Assessment   Reassessment Assessment Yes   Patient History   Procedures Relevant to Respiratory Status met with pt and changed medication to Xopenex  (Complaing of side effects with albuterol)   SVN 1/Frequency has nebulizer   DPI 1/Frequency Trelegy   Bronchodilator Protocol   Is this an exacerbation of COPD/Asthma?   (changed to Xopenex re-assess in AM per MD order)

## 2024-05-04 NOTE — CARE PLAN
The patient is Stable - Low risk of patient condition declining or worsening    Shift Goals  Clinical Goals: Pain management, PT/OT  Patient Goals: Pain Management, Comfort  Family Goals: Discharge needs, pain management    Progress made toward(s) clinical / shift goals:  Yes      Problem: Knowledge Deficit - Standard  Goal: Patient and family/care givers will demonstrate understanding of plan of care, disease process/condition, diagnostic tests and medications  Outcome: Progressing     Problem: Pain - Standard  Goal: Alleviation of pain or a reduction in pain to the patient’s comfort goal  Outcome: Progressing     Problem: Skin Integrity  Goal: Skin integrity is maintained or improved  Outcome: Progressing

## 2024-05-04 NOTE — PROGRESS NOTES
Hospital Medicine Daily Progress Note    Date of Service  5/4/2024    Chief Complaint  Isa Maravilla is a 83 y.o. female admitted 5/3/2024 with right humeral fracture    Hospital Course  83 female with past medical history of hypertension, hypothyroidism, psychiatric disorder, COPD on 2 L oxygen referred from [facility] with complicated fracture involving right humeral head for orthopedic evaluation. Orthopedic recommending nonoperative management with outpatient follow-up in 1 to 2 weeks for repeat x-ray .  PT/OT recommending postacute placement.   assisting    Interval Problem Update  5/4/2024  Seen and examined at bedside  On 3 L oxygen saturating over 90%  Labs reviewed noted leukocytosis, hemoglobin 11.7, , negative procalcitonin      Requiring IV narcotics for pain management, monitor for toxicity  Continue with DuoNeb, history  Monitor oxygen requirement  I have ordered CBC a.m. to monitor white count  PT/OT recommending placement,  assisting    I had extensive discussion with patient's daughter regarding the current medical status, treatment plan and prognosis. She  was briefed on the patient's condition including recent changes or developments .  Updated with recent test results vital signs and symptoms.  Current treatment plan was explained in details including medications, therapies and any upcoming procedure or intervention.  Discussed the patient prognosis  based on the condition response to treatment and any potential complication or risk.      I have discussed this patient's plan of care and discharge plan at IDT rounds today with Case Management, Nursing, Nursing leadership, and other members of the IDT team.    Consultants/Specialty  orthopedics    Code Status  Full Code    Disposition  The patient is not medically cleared for discharge to home or a post-acute facility.  Anticipate discharge to: skilled nursing facility    I have placed the appropriate orders  for post-discharge needs.    Review of Systems  Review of Systems   Constitutional:  Positive for malaise/fatigue.   Musculoskeletal:  Positive for joint pain.        Physical Exam  Temp:  [35.9 °C (96.6 °F)-36.4 °C (97.5 °F)] 36.4 °C (97.5 °F)  Pulse:  [67-77] 67  Resp:  [14-20] 20  BP: ()/(39-64) 122/64  SpO2:  [93 %-98 %] 95 %    Physical Exam  Constitutional:       Appearance: Normal appearance.   Cardiovascular:      Rate and Rhythm: Normal rate and regular rhythm.   Pulmonary:      Effort: Pulmonary effort is normal.      Breath sounds: Wheezing present.   Musculoskeletal:      Comments: Right arm sling noted   Neurological:      General: No focal deficit present.      Mental Status: She is alert.   Psychiatric:         Mood and Affect: Mood normal.         Fluids    Intake/Output Summary (Last 24 hours) at 5/4/2024 1335  Last data filed at 5/4/2024 0428  Gross per 24 hour   Intake --   Output 400 ml   Net -400 ml       Laboratory  Recent Labs     05/03/24 0317 05/04/24  0517   WBC 11.4* 13.1*   RBC 4.43 3.78*   HEMOGLOBIN 13.7 11.7*   HEMATOCRIT 41.6 36.0*   MCV 93.9 95.2   MCH 30.9 31.0   MCHC 32.9 32.5   RDW 47.9 48.2   PLATELETCT 203 186   MPV 11.3 11.1     Recent Labs     05/03/24  0317   SODIUM 140   POTASSIUM 4.1   CHLORIDE 103   CO2 26   GLUCOSE 90   BUN 13   CREATININE 0.55   CALCIUM 8.7     Recent Labs     05/03/24  0317   INR 1.05               Imaging  DX-CHEST-LIMITED (1 VIEW)   Final Result         1.  Left basilar atelectasis, no focal infiltrate   2.  Atherosclerosis           Assessment/Plan  * Humeral head fracture, right, closed, initial encounter  Assessment & Plan  S/p GLF    From [facility]:  X-ray right shoulder 3 views: Somewhat comminuted fracture involving the right humeral head greater tuberosity, although nonunited fracture of the lateral right clavicle  Supportive pain control  PT/OT  Orthopedic recommending nonoperative management with outpatient follow-up in 1 to 2 weeks for  repeat x-ray    Leucocytosis  Assessment & Plan  Reactive and due to steroid use  Procalcitonin negative  No concern if infection  Repeat labs in a.m.    GERD (gastroesophageal reflux disease)  Assessment & Plan  Omeprazole    Diffuse pain in right upper extremity  Assessment & Plan  Likely due to above  Supportive pain control    Insomnia  Assessment & Plan  As needed Ambien    Neuropathy  Assessment & Plan  Lyrica    ACP (advance care planning)  Assessment & Plan  Full code     Tobacco abuse- (present on admission)  Assessment & Plan  Admits to smoking 1 pack of cigarettes daily  Cessation counseling provided: 4 minutes  We discussed tobacco's smoking cessation given concern for COPD, she expressed understanding.  She states she does not wish to quit as she is very old already.  Offered nicotine patch, nicotine gum, Chantix as alternative-she declined.  Provided patient with standard tobacco cessation information per protocol    Chronic obstructive pulmonary disease with acute exacerbation (HCC)- (present on admission)  Assessment & Plan  Continue home regimen: Trelegy Ellipta  As needed DuoNebs  Smoking cessation advised    Hypothyroidism- (present on admission)  Assessment & Plan  Synthroid    Depression with anxiety- (present on admission)  Assessment & Plan  Celexa    Hypertension- (present on admission)  Assessment & Plan  Lisinopril         VTE prophylaxis: lovenox    I have performed a physical exam and reviewed and updated ROS and Plan today (5/4/2024). In review of yesterday's note (5/3/2024), there are no changes except as documented above.         Greater than 51 minutes spent preparing to see patient (e.g. review of tests) obtaining and/or reviewing separately obtained history. Performing a medically appropriate examination and/ evaluation.  Counseling and educating the patient/family/caregiver.  Ordering medications, tests, or procedures.  Referring and communicating with other health care  professionals.  Documenting clinical information in EPIC.  Independently interpreting results and communicating results to patient/family/caregiver.  Care coordination.

## 2024-05-04 NOTE — THERAPY
Occupational Therapy   Initial Evaluation     Patient Name: Isa Randallac  Age:  83 y.o., Sex:  female  Medical Record #: [id number]  Today's Date: 5/3/2024     Precautions: Fall Risk, Non Weight Bearing Right Upper Extremity  Comments: R proximal humerus fx, ROMAT    Assessment    Patient is 83 y.o. female admitted with proximal humerus fx involving the greater tuberosity, pmhx includes prior ORIF R humerus and R AC joint injury. Pt presents to OT eval limited by pain and fear of falling. Pt provided extensive education regarding AROM and PROM of shoulder as well as elbow, wrist, and digits. Pt demonstrated LB dress (socks) using only left hand bernice-dressing technique. Pt tolerated short distance ambulation with HHA on L, she could ambulate to bathroom for toileting needs with assist but declined at this time. Pt's daughter at bedside lives in [place] but is working on helping her mother move to a long term independent or assisted living facility upon DC from post-acute placement.     Plan    Occupational Therapy Initial Treatment Plan   Treatment Interventions: Self Care / Activities of Daily Living, Therapeutic Exercises, Therapeutic Activity, Adaptive Equipment  Treatment Frequency: 4 Times per Week  Duration: Until Therapy Goals Met    DC Equipment Recommendations: Tub / Shower Seat  Discharge Recommendations: Recommend post-acute placement for additional occupational therapy services prior to discharge home (consider PM&R consult as pt is observation status)     Objective     05/03/24 1405   Prior Living Situation   Prior Services Home-Independent   Housing / Facility 1 Story House   Steps Into Home 4   Steps In Home 1  (sunken living room)   Bathroom Set up Walk In Shower;Bathtub / Shower Combination   Equipment Owned Single Point Cane   Lives with - Patient's Self Care Capacity Alone and Able to Care For Self   Comments supportive daughter at bedside lives in [place], pt lives in [facility] with good  neighbors but states she knows it's time to move to Assisted Living   Prior Level of ADL Function   Self Feeding Independent   Grooming / Hygiene Independent   Bathing Independent   Dressing Independent   Toileting Independent   Prior Level of IADL Function   Medication Management Independent   Laundry Independent   Kitchen Mobility Independent   Finances Independent   Home Management Independent   Shopping Requires Assist  (friends/neighbors help)   Prior Level Of Mobility Independent With Device in Community;Independent Without Device in Home   Driving / Transportation   (hasn't driven since December)   History of Falls   History of Falls Yes   Precautions   Precautions Fall Risk;Non Weight Bearing Right Upper Extremity   Comments ROMAT   Pain 0 - 10 Group   Therapist Pain Assessment During Activity  (RUE pain, agreeable to participate despite pain)   Cognition    Level of Consciousness Alert   Comments cooperative, somewhat emotionally labile, fearful   Active ROM Upper Body   Comments LUE WDL, RUE limited by pain. Encouraged ROM at R elbow, wrist and fingers as well as PROM to shoulder to prevent frozen shoulder   Balance Assessment   Sitting Balance (Static) Fair   Sitting Balance (Dynamic) Fair   Standing Balance (Static) Poor -   Standing Balance (Dynamic) Trace +   Weight Shift Sitting Fair   Weight Shift Standing Fair   Comments support on L side for ambulation   Bed Mobility    Supine to Sit Minimal Assist   Sit to Supine Standby Assist   ADL Assessment   Eating Modified Independent   Grooming Supervision;Seated   Upper Body Dressing Moderate Assist   Lower Body Dressing Minimal Assist  (socks)   How much help from another person does the patient currently need...   Putting on and taking off regular lower body clothing? 3   Bathing (including washing, rinsing, and drying)? 2   Toileting, which includes using a toilet, bedpan, or urinal? 3   Putting on and taking off regular upper body clothing? 2   Taking  care of personal grooming such as brushing teeth? 3   Eating meals? 4   6 Clicks Daily Activity Score 17   Functional Mobility   Sit to Stand Minimal Assist   Bed, Chair, Wheelchair Transfer Minimal Assist   Transfer Method Stand Step   Mobility HHA on L   Activity Tolerance   Sitting in Chair NT   Sitting Edge of Bed 15min   Standing 5min   Short Term Goals   Short Term Goal # 1 pt will complete toileting ADL at SPV level   Short Term Goal # 2 pt will complete UB dress with Gerber   Short Term Goal # 3 pt will tolerate >3min standing grooming with SPV   Education Group   Education Provided Role of Occupational Therapist;Activities of Daily Living;Home Safety;Upper Extremity Range of Motion;Transfers;Weight Bearing Precautions   Role of Occupational Therapist Patient Response Patient;Family;Acceptance;Explanation;Verbal Demonstration   Upper Ext ROM Patient Response Patient;Family;Acceptance;Verbal Demonstration;Demonstration;Explanation   Home Safety Patient Response Patient;Family;Acceptance;Explanation;Verbal Demonstration   Transfers Patient Response Patient;Family;Acceptance;Explanation;Verbal Demonstration;Action Demonstration   ADL Patient Response Patient;Family;Acceptance;Explanation;Verbal Demonstration;Action Demonstration   Weight Bearing Precautions Patient Response Patient;Family;Acceptance;Explanation;Verbal Demonstration;Action Demonstration   Occupational Therapy Initial Treatment Plan    Treatment Interventions Self Care / Activities of Daily Living;Therapeutic Exercises;Therapeutic Activity;Adaptive Equipment   Treatment Frequency 4 Times per Week   Duration Until Therapy Goals Met   Problem List   Problem List Decreased Homemaking Skills;Decreased Active Daily Living Skills;Decreased Upper Extremity Strength Right;Decreased Upper Extremity AROM Right;Decreased Upper Extremity PROM Right;Decreased Functional Mobility;Decreased Activity Tolerance;Safety Awareness Deficits / Cognition;Impaired  Postural Control / Balance;Limited Knowledge of Post Op Precautions   Anticipated Discharge Equipment and Recommendations   DC Equipment Recommendations Tub / Shower Seat   Discharge Recommendations Recommend post-acute placement for additional occupational therapy services prior to discharge home

## 2024-05-04 NOTE — RESPIRATORY CARE
Flutter instruct performed with patient. She demonstrates proper use. She states she does not take any medication for acute respiratory distress and has a poor reaction to Albuterol.

## 2024-05-05 PROBLEM — T14.8XXA FRACTURE: Status: ACTIVE | Noted: 2024-05-05

## 2024-05-05 LAB
BASOPHILS # BLD AUTO: 0.1 % (ref 0–1.8)
BASOPHILS # BLD: 0.02 K/UL (ref 0–0.12)
EOSINOPHIL # BLD AUTO: 0.03 K/UL (ref 0–0.51)
EOSINOPHIL NFR BLD: 0.2 % (ref 0–6.9)
ERYTHROCYTE [DISTWIDTH] IN BLOOD BY AUTOMATED COUNT: 50.3 FL (ref 35.9–50)
HCT VFR BLD AUTO: 35.7 % (ref 37–47)
HGB BLD-MCNC: 11.4 G/DL (ref 12–16)
IMM GRANULOCYTES # BLD AUTO: 0.07 K/UL (ref 0–0.11)
IMM GRANULOCYTES NFR BLD AUTO: 0.5 % (ref 0–0.9)
LYMPHOCYTES # BLD AUTO: 3.08 K/UL (ref 1–4.8)
LYMPHOCYTES NFR BLD: 20.3 % (ref 22–41)
MCH RBC QN AUTO: 30.8 PG (ref 27–33)
MCHC RBC AUTO-ENTMCNC: 31.9 G/DL (ref 32.2–35.5)
MCV RBC AUTO: 96.5 FL (ref 81.4–97.8)
MONOCYTES # BLD AUTO: 1.03 K/UL (ref 0–0.85)
MONOCYTES NFR BLD AUTO: 6.8 % (ref 0–13.4)
NEUTROPHILS # BLD AUTO: 10.94 K/UL (ref 1.82–7.42)
NEUTROPHILS NFR BLD: 72.1 % (ref 44–72)
NRBC # BLD AUTO: 0 K/UL
NRBC BLD-RTO: 0 /100 WBC (ref 0–0.2)
PLATELET # BLD AUTO: 191 K/UL (ref 164–446)
PMV BLD AUTO: 11.7 FL (ref 9–12.9)
RBC # BLD AUTO: 3.7 M/UL (ref 4.2–5.4)
WBC # BLD AUTO: 15.2 K/UL (ref 4.8–10.8)

## 2024-05-05 PROCEDURE — 99232 SBSQ HOSP IP/OBS MODERATE 35: CPT | Performed by: STUDENT IN AN ORGANIZED HEALTH CARE EDUCATION/TRAINING PROGRAM

## 2024-05-05 RX ADMIN — HYDROMORPHONE HYDROCHLORIDE 0.5 MG: 1 INJECTION, SOLUTION INTRAMUSCULAR; INTRAVENOUS; SUBCUTANEOUS at 21:43

## 2024-05-05 RX ADMIN — HYDROCODONE BITARTRATE AND ACETAMINOPHEN 1 TABLET: 10; 325 TABLET ORAL at 05:01

## 2024-05-05 RX ADMIN — ENOXAPARIN SODIUM 40 MG: 100 INJECTION SUBCUTANEOUS at 17:22

## 2024-05-05 RX ADMIN — HYDROCODONE BITARTRATE AND ACETAMINOPHEN 1 TABLET: 10; 325 TABLET ORAL at 14:20

## 2024-05-05 RX ADMIN — ZOLPIDEM TARTRATE 5 MG: 5 TABLET ORAL at 01:01

## 2024-05-05 RX ADMIN — LEVOTHYROXINE SODIUM 75 MCG: 0.07 TABLET ORAL at 08:26

## 2024-05-05 RX ADMIN — LEVALBUTEROL 1.25 MG: 1.25 SOLUTION RESPIRATORY (INHALATION) at 08:15

## 2024-05-05 RX ADMIN — HYDROCODONE BITARTRATE AND ACETAMINOPHEN 1 TABLET: 10; 325 TABLET ORAL at 11:02

## 2024-05-05 RX ADMIN — CITALOPRAM HYDROBROMIDE 40 MG: 20 TABLET ORAL at 04:58

## 2024-05-05 RX ADMIN — HYDROCODONE BITARTRATE AND ACETAMINOPHEN 1 TABLET: 10; 325 TABLET ORAL at 01:01

## 2024-05-05 RX ADMIN — ZOLPIDEM TARTRATE 5 MG: 5 TABLET ORAL at 23:18

## 2024-05-05 RX ADMIN — PREDNISONE 40 MG: 20 TABLET ORAL at 09:43

## 2024-05-05 RX ADMIN — SENNOSIDES AND DOCUSATE SODIUM 2 TABLET: 50; 8.6 TABLET ORAL at 17:18

## 2024-05-05 RX ADMIN — OMEPRAZOLE 20 MG: 20 CAPSULE, DELAYED RELEASE ORAL at 04:59

## 2024-05-05 RX ADMIN — ACETAMINOPHEN 650 MG: 325 TABLET, FILM COATED ORAL at 17:17

## 2024-05-05 RX ADMIN — PREGABALIN 100 MG: 100 CAPSULE ORAL at 17:18

## 2024-05-05 RX ADMIN — ACETAMINOPHEN 650 MG: 325 TABLET, FILM COATED ORAL at 04:59

## 2024-05-05 RX ADMIN — HYDROCODONE BITARTRATE AND ACETAMINOPHEN 1 TABLET: 10; 325 TABLET ORAL at 19:48

## 2024-05-05 RX ADMIN — PREGABALIN 100 MG: 100 CAPSULE ORAL at 04:59

## 2024-05-05 RX ADMIN — OMEPRAZOLE 20 MG: 20 CAPSULE, DELAYED RELEASE ORAL at 17:18

## 2024-05-05 RX ADMIN — ACETAMINOPHEN 650 MG: 325 TABLET, FILM COATED ORAL at 12:35

## 2024-05-05 RX ADMIN — FLUTICASONE FUROATE, UMECLIDINIUM BROMIDE AND VILANTEROL TRIFENATATE 1 PUFF: 100; 62.5; 25 POWDER RESPIRATORY (INHALATION) at 05:01

## 2024-05-05 RX ADMIN — MONTELUKAST 10 MG: 10 TABLET, FILM COATED ORAL at 21:38

## 2024-05-05 ASSESSMENT — COGNITIVE AND FUNCTIONAL STATUS - GENERAL
TURNING FROM BACK TO SIDE WHILE IN FLAT BAD: A LITTLE
STANDING UP FROM CHAIR USING ARMS: A LITTLE
MOBILITY SCORE: 18
MOVING FROM LYING ON BACK TO SITTING ON SIDE OF FLAT BED: A LITTLE
MOVING TO AND FROM BED TO CHAIR: A LITTLE
SUGGESTED CMS G CODE MODIFIER MOBILITY: CK
WALKING IN HOSPITAL ROOM: A LITTLE
CLIMB 3 TO 5 STEPS WITH RAILING: A LITTLE

## 2024-05-05 ASSESSMENT — GAIT ASSESSMENTS
GAIT LEVEL OF ASSIST: CONTACT GUARD ASSIST
ASSISTIVE DEVICE: SINGLE POINT CANE
DISTANCE (FEET): 75
DEVIATION: BRADYKINETIC;DECREASED HEEL STRIKE;DECREASED TOE OFF

## 2024-05-05 ASSESSMENT — PAIN DESCRIPTION - PAIN TYPE
TYPE: ACUTE PAIN

## 2024-05-05 ASSESSMENT — FIBROSIS 4 INDEX: FIB4 SCORE: 5.18

## 2024-05-05 NOTE — THERAPY
Physical Therapy   Daily Treatment     Patient Name: Isa Randallac  Age:  83 y.o., Sex:  female  Medical Record #: [id number]  Today's Date: 5/5/2024     Precautions: Fall Risk; Non Weight Bearing Right Upper Extremity; Sling Right Upper Extremity    Assessment    Pt seen for PT tx session. Dtr present throughout and reports pt seems more lethargic today but not confused. Pt with improved mobility compared to initial evaluation. She was able to complete bed mob, transfers, and short distance amb with CGA and SPC in L hand. Tremulous throughout ambulation which she reports is new. She declined to trial stairs today. Pt noted to hold neck in L lateral flexion which dtr believes is due to sling. Propped R forearm on pillow when up to chair to loosen sling and encouraged pt to work on R lateral neck flexion to neutral. Dtr and pt report goal to transfer to placement prior to DC home as home is not currently habitable, family working [facility].      Plan    Treatment Plan Status: Continue Current Treatment Plan  Type of Treatment: Bed Mobility, Equipment, Gait Training, Neuro Re-Education / Balance, Self Care / Home Evaluation, Stair Training, Therapeutic Activities, Therapeutic Exercise  Treatment Frequency: 4 Times per Week  Treatment Duration: Until Therapy Goals Met    DC Equipment Recommendations: Unable to determine at this time  Discharge Recommendations: Recommend post-acute placement for additional physical therapy services prior to discharge home     Objective      Cognition    Cognition / Consciousness X   Level of Consciousness Alert   Attention Impaired   Comments distracted by pain   Strength Lower Body   Lower Body Strength  WDL   Other Treatments   Other Treatments Provided encouraged to work on R lateral flexion of neck as pt holding neck in L lateral flexion   Balance   Sitting Balance (Static) Fair   Sitting Balance (Dynamic) Fair   Standing Balance (Static) Fair -    Standing Balance (Dynamic) Fair -   Weight Shift Sitting Fair   Weight Shift Standing Fair   Skilled Intervention Verbal Cuing;Sequencing   Comments w/ L SPC   Bed Mobility    Supine to Sit Contact Guard Assist   Sit to Supine   (up in chair post)   Skilled Intervention Verbal Cuing;Sequencing   Gait Analysis   Gait Level Of Assist Contact Guard Assist   Assistive Device Single Point Cane   Distance (Feet) 75   # of Times Distance was Traveled 1   Deviation Bradykinetic;Decreased Heel Strike;Decreased Toe Off   # of Stairs Climbed   (declined)   Weight Bearing Status NWB RUE   Skilled Intervention Verbal Cuing;Sequencing   Comments overall very tremulous with ambulation   Functional Mobility   Sit to Stand Contact Guard Assist   Bed, Chair, Wheelchair Transfer Contact Guard Assist   Transfer Method Stand Step   Skilled Intervention Verbal Cuing   6 Clicks Assessment - How much HELP from from another person do you currently need... (If the patient hasn't done an activity recently, how much help from another person do you think he/she would need if he/she tried?)   Turning from your back to your side while in a flat bed without using bedrails? 3   Moving from lying on your back to sitting on the side of a flat bed without using bedrails? 3   Moving to and from a bed to a chair (including a wheelchair)? 3   Standing up from a chair using your arms (e.g., wheelchair, or bedside chair)? 3   Walking in hospital room? 3   Climbing 3-5 steps with a railing? 3   6 clicks Mobility Score 18   Activity Tolerance   Sitting in Chair post session   Sitting Edge of Bed 5 mins   Standing 10 mins   Short Term Goals    Short Term Goal # 1 Pt will perform bed mobility with supervision from a flat bed to progress function in 6 visits.   Goal Outcome # 1 Progressing as expected   Short Term Goal # 2 Pt will transfer with LRAD and supervision to progress function in 6 visits.   Goal Outcome # 2 Progressing as expected   Short Term Goal  # 3 Pt will ambulate 150ft with LRAD and supervision to progress to household ambulation in 6 visits.   Goal Outcome # 3 Progressing as expected   Short Term Goal # 4 Pt will negotiate 4 stairs with L hand rail and supervision for home entry in 6 visits.   Goal Outcome # 4 Goal not met

## 2024-05-05 NOTE — CARE PLAN
The patient is Stable - Low risk of patient condition declining or worsening    Shift Goals  Clinical Goals: Pain control, anxiety reduction, PT/OT  Patient Goals: Pain control, Plan  Family Goals: N/A    Progress made toward(s) clinical / shift goals:        Problem: Knowledge Deficit - Standard  Goal: Patient and family/care givers will demonstrate understanding of plan of care, disease process/condition, diagnostic tests and medications  Outcome: Progressing     Problem: Knowledge Deficit - COPD  Goal: Patient/significant other demonstrates understanding of disease process, utilization of the Action Plan, medications and discharge instruction  Outcome: Progressing     Problem: Risk for Infection - COPD  Goal: Patient will remain free from signs and symptoms of infection  Outcome: Progressing       Patient is not progressing towards the following goals:

## 2024-05-05 NOTE — PROGRESS NOTES
Hospital Medicine Daily Progress Note    Date of Service  5/5/2024    Chief Complaint  Isa Maravilla is a 83 y.o. female admitted 5/3/2024 with right humeral fracture    Hospital Course  83 female with past medical history of hypertension, hypothyroidism, psychiatric disorder, COPD on 2 L oxygen referred from [facility] with complicated fracture involving right humeral head for orthopedic evaluation. Orthopedic recommending nonoperative management with outpatient follow-up in 1 to 2 weeks for repeat x-ray .  PT/OT recommending postacute placement.   assisting    Interval Problem Update    5/5/2024  Vital signs stable  On 2 L oxygen saturating over 90%  Pain well-managed  Labs with noted leukocytosis white count 15.2, CPK trending down        Requiring IV narcotics for pain management, monitor for toxicity  Continue with DuoNeb, steroid  Monitor oxygen requirement  I have ordered CBC a.m. to monitor white count  PT/OT recommending placement,  assisting,  assisting placement    Patient's daughter at bedside.  Updated with plan.  Question answered.    I have discussed this patient's plan of care and discharge plan at IDT rounds today with Case Management, Nursing, Nursing leadership, and other members of the IDT team.    Consultants/Specialty  orthopedics    Code Status  Full Code    Disposition  The patient is not medically cleared for discharge to home or a post-acute facility.  Anticipate discharge to: skilled nursing facility    I have placed the appropriate orders for post-discharge needs.    Review of Systems  Review of Systems   Constitutional:  Positive for malaise/fatigue.   Musculoskeletal:  Positive for joint pain.        Physical Exam  Temp:  [36.2 °C (97.1 °F)-36.4 °C (97.5 °F)] 36.3 °C (97.3 °F)  Pulse:  [70-83] 82  Resp:  [17-18] 18  BP: ()/(43-79) 153/79  SpO2:  [93 %-96 %] 95 %    Physical Exam  Constitutional:       Appearance: Normal appearance.    Cardiovascular:      Rate and Rhythm: Normal rate and regular rhythm.   Pulmonary:      Effort: Pulmonary effort is normal.      Breath sounds: Wheezing present.   Musculoskeletal:      Comments: Right arm sling noted   Neurological:      General: No focal deficit present.      Mental Status: She is alert.   Psychiatric:         Mood and Affect: Mood normal.         Fluids  No intake or output data in the 24 hours ending 05/05/24 1508      Laboratory  Recent Labs     05/03/24  0317 05/04/24  0517 05/05/24  0414   WBC 11.4* 13.1* 15.2*   RBC 4.43 3.78* 3.70*   HEMOGLOBIN 13.7 11.7* 11.4*   HEMATOCRIT 41.6 36.0* 35.7*   MCV 93.9 95.2 96.5   MCH 30.9 31.0 30.8   MCHC 32.9 32.5 31.9*   RDW 47.9 48.2 50.3*   PLATELETCT 203 186 191   MPV 11.3 11.1 11.7     Recent Labs     05/03/24  0317   SODIUM 140   POTASSIUM 4.1   CHLORIDE 103   CO2 26   GLUCOSE 90   BUN 13   CREATININE 0.55   CALCIUM 8.7     Recent Labs     05/03/24  0317   INR 1.05               Imaging  DX-CHEST-LIMITED (1 VIEW)   Final Result         1.  Left basilar atelectasis, no focal infiltrate   2.  Atherosclerosis           Assessment/Plan  * Humeral head fracture, right, closed, initial encounter  Assessment & Plan  S/p GLF    From [facility]:  X-ray right shoulder 3 views: Somewhat comminuted fracture involving the right humeral head greater tuberosity, although nonunited fracture of the lateral right clavicle  Supportive pain control  PT/OT  Orthopedic recommending nonoperative management with outpatient follow-up in 1 to 2 weeks for repeat x-ray    Leucocytosis  Assessment & Plan  Reactive and due to steroid use  Procalcitonin negative  No concern if infection  Repeat labs in a.m.    GERD (gastroesophageal reflux disease)  Assessment & Plan  Omeprazole    Diffuse pain in right upper extremity  Assessment & Plan  Likely due to above  Supportive pain control    Insomnia  Assessment & Plan  As needed Ambien    Neuropathy  Assessment & Plan  Lyrica    LETI  (advance care planning)  Assessment & Plan  Full code     Tobacco abuse- (present on admission)  Assessment & Plan  Admits to smoking 1 pack of cigarettes daily  Cessation counseling provided: 4 minutes  We discussed tobacco's smoking cessation given concern for COPD, she expressed understanding.  She states she does not wish to quit as she is very old already.  Offered nicotine patch, nicotine gum, Chantix as alternative-she declined.  Provided patient with standard tobacco cessation information per protocol    Chronic obstructive pulmonary disease with acute exacerbation (HCC)- (present on admission)  Assessment & Plan  Continue home regimen: Trelegy Ellipta  As needed DuoNebs  Smoking cessation advised    Hypothyroidism- (present on admission)  Assessment & Plan  Synthroid    Depression with anxiety- (present on admission)  Assessment & Plan  Celexa    Hypertension- (present on admission)  Assessment & Plan  Lisinopril         VTE prophylaxis: lovenox    I have performed a physical exam and reviewed and updated ROS and Plan today (5/5/2024). In review of yesterday's note (5/4/2024), there are no changes except as documented above.       8

## 2024-05-05 NOTE — CARE PLAN
The patient is Stable - Low risk of patient condition declining or worsening    Shift Goals  Clinical Goals: Pain control, anxiety reduction, PT/OT  Patient Goals: Pain control, Plan  Family Goals: N/A    Progress made toward(s) clinical / shift goals:    Problem: Knowledge Deficit - Standard  Goal: Patient and family/care givers will demonstrate understanding of plan of care, disease process/condition, diagnostic tests and medications  Outcome: Progressing     Problem: Pain - Standard  Goal: Alleviation of pain or a reduction in pain to the patient’s comfort goal  Outcome: Progressing   Pain managed well with PRN medication at this time.  Patient uses pharmacological and non pharmacological pain-relief strategies. Patient displays improvement in mood, coping.    Patient is not progressing towards the following goals:

## 2024-05-06 ENCOUNTER — HOSPITAL ENCOUNTER (INPATIENT)
Facility: REHABILITATION | Age: 84
End: 2024-05-06
Attending: STUDENT IN AN ORGANIZED HEALTH CARE EDUCATION/TRAINING PROGRAM | Admitting: STUDENT IN AN ORGANIZED HEALTH CARE EDUCATION/TRAINING PROGRAM
Payer: MEDICARE

## 2024-05-06 LAB
BASOPHILS # BLD AUTO: 0.1 % (ref 0–1.8)
BASOPHILS # BLD: 0.01 K/UL (ref 0–0.12)
EOSINOPHIL # BLD AUTO: 0.01 K/UL (ref 0–0.51)
EOSINOPHIL NFR BLD: 0.1 % (ref 0–6.9)
ERYTHROCYTE [DISTWIDTH] IN BLOOD BY AUTOMATED COUNT: 48.7 FL (ref 35.9–50)
HCT VFR BLD AUTO: 36.2 % (ref 37–47)
HGB BLD-MCNC: 12.2 G/DL (ref 12–16)
IMM GRANULOCYTES # BLD AUTO: 0.04 K/UL (ref 0–0.11)
IMM GRANULOCYTES NFR BLD AUTO: 0.3 % (ref 0–0.9)
LYMPHOCYTES # BLD AUTO: 1.33 K/UL (ref 1–4.8)
LYMPHOCYTES NFR BLD: 11 % (ref 22–41)
MCH RBC QN AUTO: 31.6 PG (ref 27–33)
MCHC RBC AUTO-ENTMCNC: 33.7 G/DL (ref 32.2–35.5)
MCV RBC AUTO: 93.8 FL (ref 81.4–97.8)
MONOCYTES # BLD AUTO: 0.51 K/UL (ref 0–0.85)
MONOCYTES NFR BLD AUTO: 4.2 % (ref 0–13.4)
NEUTROPHILS # BLD AUTO: 10.21 K/UL (ref 1.82–7.42)
NEUTROPHILS NFR BLD: 84.3 % (ref 44–72)
NRBC # BLD AUTO: 0 K/UL
NRBC BLD-RTO: 0 /100 WBC (ref 0–0.2)
PLATELET # BLD AUTO: 199 K/UL (ref 164–446)
PMV BLD AUTO: 12.4 FL (ref 9–12.9)
RBC # BLD AUTO: 3.86 M/UL (ref 4.2–5.4)
WBC # BLD AUTO: 12.1 K/UL (ref 4.8–10.8)

## 2024-05-06 PROCEDURE — 99232 SBSQ HOSP IP/OBS MODERATE 35: CPT | Performed by: STUDENT IN AN ORGANIZED HEALTH CARE EDUCATION/TRAINING PROGRAM

## 2024-05-06 RX ORDER — HYDROCODONE BITARTRATE AND ACETAMINOPHEN 5; 325 MG/1; MG/1
1 TABLET ORAL ONCE
Status: COMPLETED | OUTPATIENT
Start: 2024-05-06 | End: 2024-05-06

## 2024-05-06 RX ORDER — HYDROMORPHONE HYDROCHLORIDE 1 MG/ML
0.5 INJECTION, SOLUTION INTRAMUSCULAR; INTRAVENOUS; SUBCUTANEOUS ONCE
Status: DISCONTINUED | OUTPATIENT
Start: 2024-05-06 | End: 2024-05-06

## 2024-05-06 RX ADMIN — HYDROCODONE BITARTRATE AND ACETAMINOPHEN 1 TABLET: 5; 325 TABLET ORAL at 20:14

## 2024-05-06 RX ADMIN — HYDROCODONE BITARTRATE AND ACETAMINOPHEN 1 TABLET: 10; 325 TABLET ORAL at 04:25

## 2024-05-06 RX ADMIN — HYDROCODONE BITARTRATE AND ACETAMINOPHEN 1 TABLET: 10; 325 TABLET ORAL at 12:17

## 2024-05-06 RX ADMIN — OMEPRAZOLE 20 MG: 20 CAPSULE, DELAYED RELEASE ORAL at 16:10

## 2024-05-06 RX ADMIN — CITALOPRAM HYDROBROMIDE 40 MG: 20 TABLET ORAL at 04:25

## 2024-05-06 RX ADMIN — FLUTICASONE FUROATE, UMECLIDINIUM BROMIDE AND VILANTEROL TRIFENATATE 1 PUFF: 100; 62.5; 25 POWDER RESPIRATORY (INHALATION) at 04:30

## 2024-05-06 RX ADMIN — LEVOTHYROXINE SODIUM 75 MCG: 0.07 TABLET ORAL at 08:16

## 2024-05-06 RX ADMIN — PREDNISONE 40 MG: 20 TABLET ORAL at 04:25

## 2024-05-06 RX ADMIN — HYDROCODONE BITARTRATE AND ACETAMINOPHEN 1 TABLET: 10; 325 TABLET ORAL at 16:10

## 2024-05-06 RX ADMIN — SENNOSIDES AND DOCUSATE SODIUM 2 TABLET: 50; 8.6 TABLET ORAL at 16:10

## 2024-05-06 RX ADMIN — PREGABALIN 100 MG: 100 CAPSULE ORAL at 16:10

## 2024-05-06 RX ADMIN — ZOLPIDEM TARTRATE 5 MG: 5 TABLET ORAL at 23:35

## 2024-05-06 RX ADMIN — HYDROCODONE BITARTRATE AND ACETAMINOPHEN 1 TABLET: 10; 325 TABLET ORAL at 08:21

## 2024-05-06 RX ADMIN — ENOXAPARIN SODIUM 40 MG: 100 INJECTION SUBCUTANEOUS at 16:10

## 2024-05-06 RX ADMIN — OMEPRAZOLE 20 MG: 20 CAPSULE, DELAYED RELEASE ORAL at 04:25

## 2024-05-06 RX ADMIN — PREGABALIN 100 MG: 100 CAPSULE ORAL at 04:26

## 2024-05-06 RX ADMIN — ACETAMINOPHEN 650 MG: 325 TABLET, FILM COATED ORAL at 23:35

## 2024-05-06 RX ADMIN — ACETAMINOPHEN 650 MG: 325 TABLET, FILM COATED ORAL at 04:26

## 2024-05-06 RX ADMIN — POLYETHYLENE GLYCOL 3350 1 PACKET: 17 POWDER, FOR SOLUTION ORAL at 04:45

## 2024-05-06 RX ADMIN — ACETAMINOPHEN 650 MG: 325 TABLET, FILM COATED ORAL at 12:17

## 2024-05-06 RX ADMIN — MONTELUKAST 10 MG: 10 TABLET, FILM COATED ORAL at 20:13

## 2024-05-06 ASSESSMENT — COGNITIVE AND FUNCTIONAL STATUS - GENERAL
WALKING IN HOSPITAL ROOM: A LITTLE
SUGGESTED CMS G CODE MODIFIER DAILY ACTIVITY: CK
DRESSING REGULAR UPPER BODY CLOTHING: A LOT
PERSONAL GROOMING: A LITTLE
MOVING TO AND FROM BED TO CHAIR: A LITTLE
MOBILITY SCORE: 18
DAILY ACTIVITIY SCORE: 17
HELP NEEDED FOR BATHING: A LOT
CLIMB 3 TO 5 STEPS WITH RAILING: A LITTLE
DRESSING REGULAR LOWER BODY CLOTHING: A LITTLE
TOILETING: A LITTLE
SUGGESTED CMS G CODE MODIFIER MOBILITY: CK
MOVING FROM LYING ON BACK TO SITTING ON SIDE OF FLAT BED: A LITTLE
TURNING FROM BACK TO SIDE WHILE IN FLAT BAD: A LITTLE
STANDING UP FROM CHAIR USING ARMS: A LITTLE

## 2024-05-06 ASSESSMENT — GAIT ASSESSMENTS
DEVIATION: BRADYKINETIC;DECREASED HEEL STRIKE;DECREASED TOE OFF
GAIT LEVEL OF ASSIST: CONTACT GUARD ASSIST
DISTANCE (FEET): 75
ASSISTIVE DEVICE: SINGLE POINT CANE

## 2024-05-06 ASSESSMENT — PAIN DESCRIPTION - PAIN TYPE
TYPE: ACUTE PAIN

## 2024-05-06 NOTE — PROGRESS NOTES
Virtual Nurse rounding complete.    Round Needs: Patient & daughter had questions regarding placement. Reached out LMSW Sheryl Lewis.

## 2024-05-06 NOTE — CARE PLAN
The patient is Stable - Low risk of patient condition declining or worsening    Shift Goals  Clinical Goals: safety, pain control  Patient Goals: rest. comfort  Family Goals: no family present    Progress made toward(s) clinical / shift goals:    Problem: Knowledge Deficit - Standard  Goal: Patient and family/care givers will demonstrate understanding of plan of care, disease process/condition, diagnostic tests and medications  Outcome: Progressing    Educate the patient regarding his plan of care.      Problem: Pain - Standard  Goal: Alleviation of pain or a reduction in pain to the patient’s comfort goal  Outcome: Progressing    Administer pain medications per MAR.       Patient is not progressing towards the following goals:

## 2024-05-06 NOTE — THERAPY
Occupational Therapy  Daily Treatment     Patient Name: Isa Bowser Cirac  Age:  83 y.o., Sex:  female  Medical Record #: [id number]  Today's Date: 5/6/2024     Precautions: Fall Risk, Non Weight Bearing Right Upper Extremity, Sling Right Upper Extremity  Comments: R proximal humerus fx, ROMAT    Assessment    Pt seen for OT tx. Pt demo progress towards OT goals, but continues to be limited by pain, decreased activity tolerance, balance impairments, and generalized weakness. Pt was able to stand at sink with CGA for 5 minutes to complete denture care. Pt engaged in compensatory strategies during grooming tasks to reduce engaging RUE into grooming tasks. Reported an increase in RUE pain as activity progressed. She required mod A to complete UB dressing and was able to complete LB dressing with CGA using 1 handed dressing technique. Re-emphasized importance of ROM to RUE. Pt was seen completing wrist and finger ROM, but declined attempts to complete elbow and shoulder ROM due to pain. Will continue to follow for ongoing acute OT services.     Plan    Treatment Plan Status: Continue Current Treatment Plan  Type of Treatment: Self Care / Activities of Daily Living, Therapeutic Exercises, Therapeutic Activity, Adaptive Equipment  Treatment Frequency: 4 Times per Week  Treatment Duration: Until Therapy Goals Met    DC Equipment Recommendations: Tub / Shower Seat  Discharge Recommendations: Recommend post-acute placement for additional occupational therapy services prior to discharge home     Objective      Vitals   O2 (LPM) 2   O2 Delivery Device Nasal Cannula   Pain 0 - 10 Group   Therapist Pain Assessment Post Activity Pain Same as Prior to Activity;Nurse Notified  (Not rated, reported an increase in pain in RUE with activity)   Cognition    Level of Consciousness Alert   Attention Impaired   Comments Cooperative and participatory; distracted by pain   Other Treatments   Other Treatments Provided Re-emphasized importance of  RUE ROM to reduce risk of frozen shoulder. Encouraged pt to complete following administration of pain meds to assist with making pain more tolerable   Balance   Sitting Balance (Static) Fair   Sitting Balance (Dynamic) Fair   Standing Balance (Static) Fair   Standing Balance (Dynamic) Fair -   Weight Shift Sitting Fair   Weight Shift Standing Fair   Skilled Intervention Verbal Cuing   Comments Declined use of SPC; no AD, no LOB   Bed Mobility    Supine to Sit Standby Assist   Sit to Supine Standby Assist   Scooting Contact Guard Assist   Rolling Contact Guard Assist   Skilled Intervention Verbal Cuing;Compensatory Strategies   Comments HOB elevated for patient comfort   Activities of Daily Living   Eating Modified Independent   Grooming Contact Guard Assist;Standing  (Denture care at sink)   Upper Body Dressing Moderate Assist  (Don gown)   Lower Body Dressing Contact Guard Assist  (socks with 1-handed dressing technique and tailor sitting)   Toileting   (NT; declined need during session)   Skilled Intervention Verbal Cuing;Tactile Cuing;Compensatory Strategies   How much help from another person does the patient currently need...   6 Clicks Daily Activity Score 17   Functional Mobility   Sit to Stand Contact Guard Assist   Bed, Chair, Wheelchair Transfer Contact Guard Assist   Toilet Transfers   (NT; declined need during session)   Mobility EOB <> bathroom; declined use of SPC. No AD, no LOB   Skilled Intervention Verbal Cuing;Compensatory Strategies   Activity Tolerance   Sitting Edge of Bed 5 min   Standing 8 min   Patient / Family Goals   Patient / Family Goal #1 To have less pain   Goal #1 Outcome Progressing as expected   Short Term Goals   Short Term Goal # 1 pt will complete toileting ADL at SPV level   Goal Outcome # 1 Goal not met   Short Term Goal # 2 pt will complete UB dress with Gerber   Goal Outcome # 2 Progressing as expected   Short Term Goal # 3 pt will tolerate >3min standing grooming with SPV    Goal Outcome # 3 Progressing as expected   Education Group   Education Provided Upper Extremity Range of Motion;Role of Occupational Therapist;Activities of Daily Living;Pathology of bedrest;Joint Protection   Role of Occupational Therapist Patient Response Patient;Family;Acceptance;Explanation;Verbal Demonstration   Joint Protection Patient Response Patient;Family;Acceptance;Explanation;Verbal Demonstration  (Positioning pillows to help support RUE when in bed and seated)   Upper Ext ROM Patient Response Patient;Family;Acceptance;Explanation;Demonstration;Reinforcement Needed   ADL Patient Response Patient;Family;Acceptance;Explanation;Action Demonstration   Pathology of Bedrest Patient Response Patient;Family;Acceptance;Explanation;Verbal Demonstration

## 2024-05-06 NOTE — THERAPY
"Physical Therapy   Daily Treatment     Patient Name: Isa Bowser Cirac  Age:  83 y.o., Sex:  female  Medical Record #: [id number]  Today's Date: 5/6/2024     Precautions  Precautions: Fall Risk;Non Weight Bearing Right Upper Extremity;Sling Right Upper Extremity  Comments: R proximal humerus fx, ROMAT    Assessment    Pt received in bed and agreeable to PT session. Pt required overall CGA to standby assist mobilize to EOB, STS, perform transfers, and ambulate with a SPC. Pt provided with cues on use of the cane. Pt reported using 2L of oxygen at baseline and wouldn't be able to use the cane and also carry her O2 concentrator that she pulls on a cart. Pt will benefit from further acute PT to progress prior to return home. Will follow for acute PT to progress.    Plan    Treatment Plan Status: Continue Current Treatment Plan  Type of Treatment: Bed Mobility, Equipment, Gait Training, Neuro Re-Education / Balance, Self Care / Home Evaluation, Stair Training, Therapeutic Activities, Therapeutic Exercise  Treatment Frequency: 4 Times per Week  Treatment Duration: Until Therapy Goals Met    DC Equipment Recommendations: Unable to determine at this time  Discharge Recommendations: Recommend post-acute placement for additional physical therapy services prior to discharge home    Subjective    \"Moving my arm at all just hurts\"     Objective       05/06/24 1355   Precautions   Precautions Fall Risk;Non Weight Bearing Right Upper Extremity;Sling Right Upper Extremity   Comments R proximal humerus fx, ROMAT   Vitals   O2 (LPM) 2   O2 Delivery Device Nasal Cannula   Vitals Comments Pt reports using 2L at home   Pain 0 - 10 Group   Therapist Pain Assessment Post Activity Pain Same as Prior to Activity;Nurse Notified;7   Cognition    Level of Consciousness Alert   Comments Pleasant and cooperative, receptive to education   Balance   Sitting Balance (Static) Fair   Sitting Balance (Dynamic) Fair   Standing Balance (Static) Fair "   Standing Balance (Dynamic) Fair -   Weight Shift Sitting Fair   Weight Shift Standing Fair   Skilled Intervention Compensatory Strategies;Verbal Cuing   Comments SPC in L hand   Bed Mobility    Supine to Sit Standby Assist   Scooting Contact Guard Assist   Rolling Contact Guard Assist   Skilled Intervention Verbal Cuing;Compensatory Strategies   Comments HOB elevated, rolls to the L   Gait Analysis   Gait Level Of Assist Contact Guard Assist   Assistive Device Single Point Cane   Distance (Feet) 75   # of Times Distance was Traveled 2   Deviation Bradykinetic;Decreased Heel Strike;Decreased Toe Off   Skilled Intervention Verbal Cuing;Compensatory Strategies   Comments Cues for SPC use, pt with increased sway while walking and reports decreased sensation in her feet   Functional Mobility   Sit to Stand Contact Guard Assist   Bed, Chair, Wheelchair Transfer Contact Guard Assist   Toilet Transfers Contact Guard Assist   Transfer Method Stand Step   Mobility up with SPC   Skilled Intervention Verbal Cuing;Compensatory Strategies   6 Clicks Assessment - How much HELP from from another person do you currently need... (If the patient hasn't done an activity recently, how much help from another person do you think he/she would need if he/she tried?)   Turning from your back to your side while in a flat bed without using bedrails? 3   Moving from lying on your back to sitting on the side of a flat bed without using bedrails? 3   Moving to and from a bed to a chair (including a wheelchair)? 3   Standing up from a chair using your arms (e.g., wheelchair, or bedside chair)? 3   Walking in hospital room? 3   Climbing 3-5 steps with a railing? 3   6 clicks Mobility Score 18   Short Term Goals    Short Term Goal # 1 Pt will perform bed mobility with supervision from a flat bed to progress function in 6 visits.   Goal Outcome # 1 Progressing as expected   Short Term Goal # 2 Pt will transfer with LRAD and supervision to progress  function in 6 visits.   Goal Outcome # 2 Progressing as expected   Short Term Goal # 3 Pt will ambulate 150ft with LRAD and supervision to progress to household ambulation in 6 visits.   Goal Outcome # 3 Progressing as expected   Short Term Goal # 4 Pt will negotiate 4 stairs with L hand rail and supervision for home entry in 6 visits.   Goal Outcome # 4 Goal not met   Physical Therapy Treatment Plan   Physical Therapy Treatment Plan Continue Current Treatment Plan   Anticipated Discharge Equipment and Recommendations   DC Equipment Recommendations Unable to determine at this time   Discharge Recommendations Recommend post-acute placement for additional physical therapy services prior to discharge home   Interdisciplinary Plan of Care Collaboration   IDT Collaboration with  Nursing;Family / Caregiver   Patient Position at End of Therapy In Bed;Call Light within Reach;Tray Table within Reach;Phone within Reach;Family / Friend in Room   Collaboration Comments RN updated

## 2024-05-06 NOTE — DISCHARGE PLANNING
[facility] Transitional Care Coordination    Referral from: Dr Perkins  Insurance Provider on Facesheet: Medicare  Potential Rehab Diagnosis: Debility    Chart review indicates patient may have on going medical management and may have therapy needs to possibly meet inpatient rehab facility criteria with the goal of returning to community.    D/C support: Daughter/friend     Physiatry consultation forwarded per protocol.     Physiatry to review, TCC will follow.     Thank you for the referral.

## 2024-05-06 NOTE — PROGRESS NOTES
Hospital Medicine Daily Progress Note    Date of Service  5/6/2024    Chief Complaint  Isa Maravilla is a 83 y.o. female admitted 5/3/2024 with right humeral fracture    Hospital Course  83 female with past medical history of hypertension, hypothyroidism, psychiatric disorder, COPD on 2 L oxygen referred from [facility] with complicated fracture involving right humeral head for orthopedic evaluation. Orthopedic recommending nonoperative management with outpatient follow-up in 1 to 2 weeks for repeat x-ray .  Noted to be in acute COPD exacerbation and was started on DuoNeb and steroid.  PT/OT recommending postacute placement.   assisting.PMR  consulted.    Interval Problem Update    5/6/2024  Seen and examined at bedside  Elevated blood pressure  On 1 L oxygen saturating above 90%  Denies any discomfort  Labs reviewed leukocytosis improving white count 12.1        Requiring IV narcotics for pain management, monitor for toxicity.  I have ordered CBC in a.m. to monitor white count  Continue with DuoNeb, steroid  Monitor oxygen requirement  PMR been consulted for evaluation  PT/OT recommending placement,  assisting,  assisting placement.    I had extensive discussion with patient's daughter regarding the current medical status, treatment plan and prognosis.  She was briefed on the patient's condition including recent changes or developments .  Updated with recent test results vital signs and symptoms.  Current treatment plan was explained in details including medications, therapies and any upcoming procedure or intervention.  All questions answered.    I have discussed this patient's plan of care and discharge plan at IDT rounds today with Case Management, Nursing, Nursing leadership, and other members of the IDT team.    Consultants/Specialty  orthopedics    Code Status  Full Code    Disposition  The patient is not medically cleared for discharge to home or a post-acute  facility.  Anticipate discharge to: skilled nursing facility    I have placed the appropriate orders for post-discharge needs.    Review of Systems  Review of Systems   Constitutional:  Positive for malaise/fatigue.   Musculoskeletal:  Positive for joint pain.        Physical Exam  Temp:  [36.3 °C (97.3 °F)-36.6 °C (97.9 °F)] 36.6 °C (97.9 °F)  Pulse:  [63-84] 69  Resp:  [16-17] 16  BP: (143-159)/(56-80) 159/80  SpO2:  [92 %-98 %] 94 %    Physical Exam  Constitutional:       Appearance: Normal appearance.   Cardiovascular:      Rate and Rhythm: Normal rate and regular rhythm.   Pulmonary:      Effort: Pulmonary effort is normal. No respiratory distress.      Breath sounds: No wheezing.   Musculoskeletal:      Comments: Right arm sling noted   Neurological:      General: No focal deficit present.      Mental Status: She is alert.   Psychiatric:         Mood and Affect: Mood normal.         Fluids  No intake or output data in the 24 hours ending 05/06/24 1351      Laboratory  Recent Labs     05/04/24  0517 05/05/24  0414 05/06/24  0703   WBC 13.1* 15.2* 12.1*   RBC 3.78* 3.70* 3.86*   HEMOGLOBIN 11.7* 11.4* 12.2   HEMATOCRIT 36.0* 35.7* 36.2*   MCV 95.2 96.5 93.8   MCH 31.0 30.8 31.6   MCHC 32.5 31.9* 33.7   RDW 48.2 50.3* 48.7   PLATELETCT 186 191 199   MPV 11.1 11.7 12.4                           Imaging  DX-CHEST-LIMITED (1 VIEW)   Final Result         1.  Left basilar atelectasis, no focal infiltrate   2.  Atherosclerosis           Assessment/Plan  * Humeral head fracture, right, closed, initial encounter  Assessment & Plan  S/p GLF    From [facility]:  X-ray right shoulder 3 views: Somewhat comminuted fracture involving the right humeral head greater tuberosity, although nonunited fracture of the lateral right clavicle  Supportive pain control  PT/OT  Orthopedic recommending nonoperative management with outpatient follow-up in 1 to 2 weeks for repeat x-ray    Leucocytosis  Assessment & Plan  Reactive and due to  steroid use  Procalcitonin negative  Leukocytosis improving    GERD (gastroesophageal reflux disease)  Assessment & Plan  Omeprazole    Diffuse pain in right upper extremity  Assessment & Plan  Likely due to above  Supportive pain control    Insomnia  Assessment & Plan  As needed Ambien    Neuropathy  Assessment & Plan  Lyrica    ACP (advance care planning)  Assessment & Plan  Full code     Tobacco abuse- (present on admission)  Assessment & Plan  Counseling provided    Chronic obstructive pulmonary disease with acute exacerbation (HCC)- (present on admission)  Assessment & Plan  Continue DuoNeb, steroid  Monitor oxygen requirement    Hypothyroidism- (present on admission)  Assessment & Plan  Synthroid    Depression with anxiety- (present on admission)  Assessment & Plan  Celexa    Hypertension- (present on admission)  Assessment & Plan  Lisinopril         VTE prophylaxis: lovenox    I have performed a physical exam and reviewed and updated ROS and Plan today (5/6/2024). In review of yesterday's note (5/5/2024), there are no changes except as documented above.

## 2024-05-06 NOTE — DISCHARGE PLANNING
Dr Hillman reviewed, patient lacks medical complexity to qualify for IPR. Notified attending and CM, [facility] will no longer follow.

## 2024-05-06 NOTE — HOSPITAL COURSE
83 female with past medical history of hypertension, hypothyroidism, psychiatric disorder, COPD on 2 L oxygen referred from [facility] with complicated fracture involving right humeral head for orthopedic evaluation. Orthopedic recommending nonoperative management with outpatient follow-up in 1 to 2 weeks for repeat x-ray .  Noted to be in acute COPD exacerbation and was started on DuoNeb and steroid.  PT/OT recommending postacute placement.   assisting.PMR  consulted.

## 2024-05-06 NOTE — DISCHARGE PLANNING
Case Management Discharge Planning    Admission Date: 5/3/2024  GMLOS: 2.8  ALOS: 1    6-Clicks ADL Score: 17  6-Clicks Mobility Score: 18  PT and/or OT Eval ordered: Yes  Post-acute Referrals Ordered: Yes  Post-acute Choice Obtained: Yes  Has referral(s) been sent to post-acute provider:  Yes      Anticipated Discharge Dispo: Discharge Disposition: D/T to SNF with Medicare cert in anticipation of skilled care (03)  Discharge Address: [address] [place] [address] [place] [address]  Discharge Contact Phone Number: [phone]    DME Needed: No    Action(s) Taken: LMSW spoke with pt at bedside to discuss post-acute placement. Per previous CM notes, pt needs to meet 3 midnights on 5/8 before transferring to SNF. Pt made aware. Pt made choice for 1) [facility] and 2) [facility]. Choice form faxed to [facility].    Escalations Completed: None    Medically Clear: No    Next Steps: LMSW to follow for any additional CM needs.    Barriers to Discharge: Medical clearance    Is the patient up for discharge tomorrow: No

## 2024-05-07 LAB
BASOPHILS # BLD AUTO: 0.2 % (ref 0–1.8)
BASOPHILS # BLD: 0.02 K/UL (ref 0–0.12)
EOSINOPHIL # BLD AUTO: 0.07 K/UL (ref 0–0.51)
EOSINOPHIL NFR BLD: 0.6 % (ref 0–6.9)
ERYTHROCYTE [DISTWIDTH] IN BLOOD BY AUTOMATED COUNT: 50.4 FL (ref 35.9–50)
HCT VFR BLD AUTO: 40.4 % (ref 37–47)
HGB BLD-MCNC: 12.6 G/DL (ref 12–16)
IMM GRANULOCYTES # BLD AUTO: 0.03 K/UL (ref 0–0.11)
IMM GRANULOCYTES NFR BLD AUTO: 0.3 % (ref 0–0.9)
LYMPHOCYTES # BLD AUTO: 5.23 K/UL (ref 1–4.8)
LYMPHOCYTES NFR BLD: 46 % (ref 22–41)
MCH RBC QN AUTO: 30.3 PG (ref 27–33)
MCHC RBC AUTO-ENTMCNC: 31.2 G/DL (ref 32.2–35.5)
MCV RBC AUTO: 97.1 FL (ref 81.4–97.8)
MONOCYTES # BLD AUTO: 0.73 K/UL (ref 0–0.85)
MONOCYTES NFR BLD AUTO: 6.4 % (ref 0–13.4)
NEUTROPHILS # BLD AUTO: 5.29 K/UL (ref 1.82–7.42)
NEUTROPHILS NFR BLD: 46.5 % (ref 44–72)
NRBC # BLD AUTO: 0 K/UL
NRBC BLD-RTO: 0 /100 WBC (ref 0–0.2)
PLATELET # BLD AUTO: 225 K/UL (ref 164–446)
PMV BLD AUTO: 12 FL (ref 9–12.9)
RBC # BLD AUTO: 4.16 M/UL (ref 4.2–5.4)
WBC # BLD AUTO: 11.4 K/UL (ref 4.8–10.8)

## 2024-05-07 PROCEDURE — 99231 SBSQ HOSP IP/OBS SF/LOW 25: CPT | Performed by: STUDENT IN AN ORGANIZED HEALTH CARE EDUCATION/TRAINING PROGRAM

## 2024-05-07 RX ORDER — BISACODYL 10 MG
10 SUPPOSITORY, RECTAL RECTAL DAILY
Status: DISCONTINUED | OUTPATIENT
Start: 2024-05-07 | End: 2024-05-07

## 2024-05-07 RX ORDER — CYCLOBENZAPRINE HCL 10 MG
5 TABLET ORAL
Status: DISCONTINUED | OUTPATIENT
Start: 2024-05-07 | End: 2024-05-08 | Stop reason: HOSPADM

## 2024-05-07 RX ORDER — BISACODYL 10 MG
10 SUPPOSITORY, RECTAL RECTAL
Status: DISCONTINUED | OUTPATIENT
Start: 2024-05-07 | End: 2024-05-08 | Stop reason: HOSPADM

## 2024-05-07 RX ADMIN — HYDROCODONE BITARTRATE AND ACETAMINOPHEN 1 TABLET: 10; 325 TABLET ORAL at 15:01

## 2024-05-07 RX ADMIN — BISACODYL 10 MG: 10 SUPPOSITORY RECTAL at 15:01

## 2024-05-07 RX ADMIN — PREGABALIN 100 MG: 100 CAPSULE ORAL at 05:57

## 2024-05-07 RX ADMIN — CITALOPRAM HYDROBROMIDE 40 MG: 20 TABLET ORAL at 05:57

## 2024-05-07 RX ADMIN — CYCLOBENZAPRINE 5 MG: 10 TABLET, FILM COATED ORAL at 12:08

## 2024-05-07 RX ADMIN — PREDNISONE 40 MG: 20 TABLET ORAL at 05:59

## 2024-05-07 RX ADMIN — OMEPRAZOLE 20 MG: 20 CAPSULE, DELAYED RELEASE ORAL at 05:57

## 2024-05-07 RX ADMIN — HYDROCODONE BITARTRATE AND ACETAMINOPHEN 1 TABLET: 10; 325 TABLET ORAL at 05:59

## 2024-05-07 RX ADMIN — LEVOTHYROXINE SODIUM 75 MCG: 0.07 TABLET ORAL at 06:00

## 2024-05-07 RX ADMIN — HYDROCODONE BITARTRATE AND ACETAMINOPHEN 1 TABLET: 10; 325 TABLET ORAL at 10:10

## 2024-05-07 RX ADMIN — OMEPRAZOLE 20 MG: 20 CAPSULE, DELAYED RELEASE ORAL at 16:28

## 2024-05-07 RX ADMIN — HYDROCODONE BITARTRATE AND ACETAMINOPHEN 1 TABLET: 10; 325 TABLET ORAL at 20:22

## 2024-05-07 RX ADMIN — ZOLPIDEM TARTRATE 5 MG: 5 TABLET ORAL at 23:20

## 2024-05-07 RX ADMIN — ACETAMINOPHEN 650 MG: 325 TABLET, FILM COATED ORAL at 05:58

## 2024-05-07 RX ADMIN — ENOXAPARIN SODIUM 40 MG: 100 INJECTION SUBCUTANEOUS at 16:28

## 2024-05-07 RX ADMIN — ACETAMINOPHEN 650 MG: 325 TABLET, FILM COATED ORAL at 12:08

## 2024-05-07 RX ADMIN — ACETAMINOPHEN 650 MG: 325 TABLET, FILM COATED ORAL at 16:28

## 2024-05-07 RX ADMIN — MONTELUKAST 10 MG: 10 TABLET, FILM COATED ORAL at 20:22

## 2024-05-07 RX ADMIN — FLUTICASONE FUROATE, UMECLIDINIUM BROMIDE AND VILANTEROL TRIFENATATE 1 PUFF: 100; 62.5; 25 POWDER RESPIRATORY (INHALATION) at 05:57

## 2024-05-07 RX ADMIN — CYCLOBENZAPRINE 5 MG: 10 TABLET, FILM COATED ORAL at 21:40

## 2024-05-07 RX ADMIN — PREGABALIN 100 MG: 100 CAPSULE ORAL at 16:28

## 2024-05-07 RX ADMIN — SENNOSIDES AND DOCUSATE SODIUM 2 TABLET: 50; 8.6 TABLET ORAL at 16:28

## 2024-05-07 ASSESSMENT — COGNITIVE AND FUNCTIONAL STATUS - GENERAL
HELP NEEDED FOR BATHING: A LOT
SUGGESTED CMS G CODE MODIFIER DAILY ACTIVITY: CK
DAILY ACTIVITIY SCORE: 17
DRESSING REGULAR LOWER BODY CLOTHING: A LITTLE
TOILETING: A LITTLE
PERSONAL GROOMING: A LITTLE
DRESSING REGULAR UPPER BODY CLOTHING: A LOT

## 2024-05-07 ASSESSMENT — PAIN DESCRIPTION - PAIN TYPE
TYPE: SURGICAL PAIN;ACUTE PAIN
TYPE: ACUTE PAIN
TYPE: ACUTE PAIN
TYPE: SURGICAL PAIN;ACUTE PAIN
TYPE: ACUTE PAIN
TYPE: ACUTE PAIN

## 2024-05-07 NOTE — CARE PLAN
The patient is Stable - Low risk of patient condition declining or worsening    Shift Goals  Clinical Goals: pain control, safety  Patient Goals: discharge, pain control  Family Goals: discharge    Progress made toward(s) clinical / shift goals:    Problem: Knowledge Deficit - Standard  Goal: Patient and family/care givers will demonstrate understanding of plan of care, disease process/condition, diagnostic tests and medications  Outcome: Progressing     Problem: Pain - Standard  Goal: Alleviation of pain or a reduction in pain to the patient’s comfort goal  Outcome: Progressing       Patient is not progressing towards the following goals:

## 2024-05-07 NOTE — PROGRESS NOTES
Hospital Medicine Daily Progress Note    Date of Service  5/7/2024    Chief Complaint  Isa Maravilla is a 83 y.o. female admitted 5/3/2024 with right humeral fracture    Hospital Course  83 female with past medical history of hypertension, hypothyroidism, psychiatric disorder, COPD on 2 L oxygen referred from [facility] with complicated fracture involving right humeral head for orthopedic evaluation. Orthopedic recommending nonoperative management with outpatient follow-up in 1 to 2 weeks for repeat x-ray .  Noted to be in acute COPD exacerbation and was started on DuoNeb and steroid.  PT/OT recommending postacute placement.   assisting.    Interval Problem Update  No acute events overnight.  Patient reports ongoing pain by right upper arm, with pain radiating down arm as well.  Continue multimodal pain control regimen including home lyrica and flexeril.  Continue to work on bowel movement with bowel regimen medications.  Patient is medically cleared to discharge to SNF, discharge to SNF planned for tomorrow.    I have discussed this patient's plan of care and discharge plan at IDT rounds today with Case Management, Nursing, Nursing leadership, and other members of the IDT team.    Consultants/Specialty  orthopedics    Code Status  Full Code    Disposition  Medically Cleared  I have placed the appropriate orders for post-discharge needs.    Review of Systems  Review of Systems   Constitutional:  Positive for malaise/fatigue.   Musculoskeletal:  Positive for joint pain.        Physical Exam  Temp:  [36.2 °C (97.1 °F)] 36.2 °C (97.1 °F)  Pulse:  [71-78] 75  Resp:  [16-18] 16  BP: (142-165)/() 157/64  SpO2:  [93 %-97 %] 95 %    Physical Exam  Constitutional:       Appearance: Normal appearance.   Cardiovascular:      Rate and Rhythm: Normal rate and regular rhythm.   Pulmonary:      Effort: Pulmonary effort is normal. No respiratory distress.      Breath sounds: No wheezing.    Musculoskeletal:      Comments: Right arm sling noted   Neurological:      General: No focal deficit present.      Mental Status: She is alert.   Psychiatric:         Mood and Affect: Mood normal.         Fluids    Intake/Output Summary (Last 24 hours) at 5/7/2024 1443  Last data filed at 5/7/2024 1009  Gross per 24 hour   Intake 240 ml   Output --   Net 240 ml         Laboratory  Recent Labs     05/05/24  0414 05/06/24  0703 05/07/24  0700   WBC 15.2* 12.1* 11.4*   RBC 3.70* 3.86* 4.16*   HEMOGLOBIN 11.4* 12.2 12.6   HEMATOCRIT 35.7* 36.2* 40.4   MCV 96.5 93.8 97.1   MCH 30.8 31.6 30.3   MCHC 31.9* 33.7 31.2*   RDW 50.3* 48.7 50.4*   PLATELETCT 191 199 225   MPV 11.7 12.4 12.0                           Imaging  DX-CHEST-LIMITED (1 VIEW)   Final Result         1.  Left basilar atelectasis, no focal infiltrate   2.  Atherosclerosis           Assessment/Plan  * Humeral head fracture, right, closed, initial encounter  Assessment & Plan  S/p GLF    From [facility]:  X-ray right shoulder 3 views: Somewhat comminuted fracture involving the right humeral head greater tuberosity, although nonunited fracture of the lateral right clavicle  Supportive pain control  PT/OT  Orthopedic recommending nonoperative management with outpatient follow-up in 1 to 2 weeks for repeat x-ray    Leucocytosis  Assessment & Plan  Reactive and due to steroid use  Procalcitonin negative  Leukocytosis improving    GERD (gastroesophageal reflux disease)  Assessment & Plan  Omeprazole    Diffuse pain in right upper extremity  Assessment & Plan  Likely due to above  Supportive pain control    Insomnia  Assessment & Plan  As needed Ambien    Neuropathy  Assessment & Plan  Lyrica    ACP (advance care planning)  Assessment & Plan  Full code     Tobacco abuse- (present on admission)  Assessment & Plan  Counseling provided    Chronic obstructive pulmonary disease with acute exacerbation (HCC)- (present on admission)  Assessment & Plan  Continue DuoNeb,  steroid  Monitor oxygen requirement    Hypothyroidism- (present on admission)  Assessment & Plan  Synthroid    Depression with anxiety- (present on admission)  Assessment & Plan  Celexa    Hypertension- (present on admission)  Assessment & Plan  Lisinopril         VTE prophylaxis: lovenox
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
## 2024-05-07 NOTE — THERAPY
Occupational Therapy  Daily Treatment     Patient Name: Isa Bowser Cirac  Age:  83 y.o., Sex:  female  Medical Record #: [id number]  Today's Date: 5/7/2024     Precautions: Fall Risk, Non Weight Bearing Right Upper Extremity, Sling Right Upper Extremity  Comments: R proximal humerus fx, ROMAT    Assessment    Pt seen for OT tx to include ADLs and RUE ROM. Pt demo ability to complete most ADLs, functional mobility, and txfs with CGA and no AD. Required mod A to manage sling. Pt demo slight improvements in R distal UE ROM, but is unable to achieve full ROM due to pain. Pt appears to have an increase in left lateral neck flexion when lying in bed; re-iterated importance of trying to complete right lateral neck flexion to reduce muscle tightness and return to neutral postion. Will continue to follow for ongoing acute OT services.     Plan    Treatment Plan Status: Continue Current Treatment Plan  Type of Treatment: Self Care / Activities of Daily Living, Therapeutic Exercises, Therapeutic Activity, Adaptive Equipment  Treatment Frequency: 4 Times per Week  Treatment Duration: Until Therapy Goals Met    DC Equipment Recommendations: Tub / Shower Seat  Discharge Recommendations: Recommend post-acute placement for additional occupational therapy services prior to discharge home     Objective      Precautions   Precautions Fall Risk;Non Weight Bearing Right Upper Extremity;Sling Right Upper Extremity   Comments R proximal humerus fx, ROMAT   Vitals   O2 (LPM) 2   O2 Delivery Device Nasal Cannula   Vitals Comments Baseline O2 use   Pain 0 - 10 Group   Therapist Pain Assessment Post Activity Pain Same as Prior to Activity;Nurse Notified  (Not rated, reported an increase in RUE pain with activity)   Cognition    Comments Pleasant and cooperative   Active ROM Upper Body   Comments Continues to report an increase in RUE pain with ROM; unable to tolerate any attempts at shoulder flexion/abduction; demo some improvements in elbow  flexion/extension but limited by pain. Able to acheieve full wrist flexion and digit extension/flexion.   Balance   Sitting Balance (Static) Fair   Sitting Balance (Dynamic) Fair   Standing Balance (Static) Fair   Standing Balance (Dynamic) Fair -   Weight Shift Sitting Fair   Weight Shift Standing Fair   Skilled Intervention Verbal Cuing   Comments Declined use of SPC; no AD, no LOB   Bed Mobility    Supine to Sit Standby Assist   Sit to Supine Standby Assist   Scooting Contact Guard Assist   Rolling Contact Guard Assist   Skilled Intervention Verbal Cuing;Compensatory Strategies   Comments HOB elevated   Activities of Daily Living   Eating Modified Independent   Grooming Contact Guard Assist;Standing   Upper Body Dressing Moderate Assist  (Sling)   Lower Body Dressing Contact Guard Assist  (Doff socks and don personal shoes)   Toileting Contact Guard Assist  (Requried assist for balance with pericare)   Skilled Intervention Verbal Cuing;Tactile Cuing;Compensatory Strategies   How much help from another person does the patient currently need...   6 Clicks Daily Activity Score 17   Functional Mobility   Sit to Stand Contact Guard Assist   Bed, Chair, Wheelchair Transfer Contact Guard Assist   Toilet Transfers Contact Guard Assist  (Has BSC placed over toilet)   Mobility Functional mobility in room; declined use of AD   Skilled Intervention Verbal Cuing;Compensatory Strategies   Activity Tolerance   Sitting in Chair 3 min  (Toilet)   Sitting Edge of Bed 5 min   Standing 8 min   Patient / Family Goals   Patient / Family Goal #1 To have less pain   Goal #1 Outcome Progressing as expected   Short Term Goals   Short Term Goal # 1 pt will complete toileting ADL at SPV level   Goal Outcome # 1 Progressing as expected   Short Term Goal # 2 pt will complete UB dress with Gerber   Goal Outcome # 2 Progressing as expected   Short Term Goal # 3 pt will tolerate >3min standing grooming with SPV   Goal Outcome # 3 Progressing as  expected   Education Group   Education Provided Upper Extremity Range of Motion;Role of Occupational Therapist;Activities of Daily Living;Brace Wear and Care   Role of Occupational Therapist Patient Response Patient;Acceptance;Explanation;Verbal Demonstration   Upper Ext ROM Patient Response Patient;Acceptance;Explanation;Verbal Demonstration   Brace Wear & Care Patient Response Patient;Acceptance;Explanation;Demonstration;Verbal Demonstration   Pathology of Bedrest Patient Response Patient;Acceptance;Explanation;Verbal Demonstration

## 2024-05-07 NOTE — DOCUMENTATION QUERY
[facility]                                                                       Query Response Note      PATIENT:               GIANNI PERRY  ACCT #:                  [id number]  MRN:                     [id number]  :                      1940  ADMIT DATE:       5/3/2024 1:47 AM  DISCH DATE:          RESPONDING  PROVIDER #:        [id number]           QUERY TEXT:    Chronic home 02 use is documented in the Medical Record. Please further specify the condition this patient has:      The patient's Clinical Indicators include:  Findings:  --Patient admitted for right closed humeral head fx and AE COPD  --Per PN , ''COPD on 2L oxygen'' documented  --Per PT documentation on , ''Pt reported using 2L of oxygen at baseline and wouldn't be able to use      the cane and also carry her O2 concentrator that she pulls on a cart'' documented  --SpO2 on admission :  98% on 2L [place]  --SpO2 trend on current admission:  93-98% on 1-3L [place]  --RR during current admission:  14-18  --CXR on  admission:  Left basilar atelectasis, no focal infiltrate, atherosclerosis    Treatment:  --Continue home inhalers  --Supplemental O2  --Neb txs every 2 hrs prn SOB/wheezing  --Singulair tablet 10mg po nightly  --RT consult  --Prednisone tablet 40mg oral daily (completed)    Risk Factors:  --AE COPD  --Reported 2L baseline O2 use    Thank you,  Ashley Ferreira RN, BSN  Clinical   Connect via [employer]  Options provided:   -- Chronic Respiratory Failure   -- Chronic Hypoxemia   -- Other explanation, please specify   -- Unable to determine      Query created by: Ashley Ferreira on 2024 9:38 AM    RESPONSE TEXT:    Chronic Respiratory Failure          Electronically signed by:  ROSEMARY MCINTYRE MD 2024 4:12 PM

## 2024-05-08 VITALS
RESPIRATION RATE: 16 BRPM | HEIGHT: 66 IN | DIASTOLIC BLOOD PRESSURE: 68 MMHG | HEART RATE: 70 BPM | SYSTOLIC BLOOD PRESSURE: 153 MMHG | OXYGEN SATURATION: 97 % | WEIGHT: 141.09 LBS | TEMPERATURE: 97.7 F | BODY MASS INDEX: 22.68 KG/M2

## 2024-05-08 PROCEDURE — 99239 HOSP IP/OBS DSCHRG MGMT >30: CPT | Performed by: STUDENT IN AN ORGANIZED HEALTH CARE EDUCATION/TRAINING PROGRAM

## 2024-05-08 RX ORDER — ZOLPIDEM TARTRATE 6.25 MG/1
6.25 TABLET, FILM COATED, EXTENDED RELEASE ORAL NIGHTLY PRN
Qty: 30 TABLET | Refills: 0 | Status: SHIPPED | OUTPATIENT
Start: 2024-05-08 | End: 2024-06-07

## 2024-05-08 RX ORDER — HYDROCODONE BITARTRATE AND ACETAMINOPHEN 10; 325 MG/1; MG/1
1 TABLET ORAL EVERY 8 HOURS PRN
Qty: 28 TABLET | Refills: 0 | Status: SHIPPED | OUTPATIENT
Start: 2024-05-08 | End: 2024-05-15

## 2024-05-08 RX ORDER — PREGABALIN 100 MG/1
100 CAPSULE ORAL 2 TIMES DAILY
Qty: 60 CAPSULE | Refills: 0 | Status: SHIPPED | OUTPATIENT
Start: 2024-05-08 | End: 2024-06-07

## 2024-05-08 RX ADMIN — CITALOPRAM HYDROBROMIDE 40 MG: 20 TABLET ORAL at 05:37

## 2024-05-08 RX ADMIN — LEVOTHYROXINE SODIUM 75 MCG: 0.07 TABLET ORAL at 05:36

## 2024-05-08 RX ADMIN — OMEPRAZOLE 20 MG: 20 CAPSULE, DELAYED RELEASE ORAL at 05:36

## 2024-05-08 RX ADMIN — HYDROCODONE BITARTRATE AND ACETAMINOPHEN 1 TABLET: 10; 325 TABLET ORAL at 10:10

## 2024-05-08 RX ADMIN — CYCLOBENZAPRINE 5 MG: 10 TABLET, FILM COATED ORAL at 08:27

## 2024-05-08 RX ADMIN — MAGNESIUM HYDROXIDE 30 ML: 1200 LIQUID ORAL at 06:47

## 2024-05-08 RX ADMIN — FLUTICASONE FUROATE, UMECLIDINIUM BROMIDE AND VILANTEROL TRIFENATATE 1 PUFF: 100; 62.5; 25 POWDER RESPIRATORY (INHALATION) at 05:39

## 2024-05-08 RX ADMIN — HYDROCODONE BITARTRATE AND ACETAMINOPHEN 1 TABLET: 10; 325 TABLET ORAL at 05:37

## 2024-05-08 RX ADMIN — PREGABALIN 100 MG: 100 CAPSULE ORAL at 05:36

## 2024-05-08 ASSESSMENT — PAIN DESCRIPTION - PAIN TYPE: TYPE: ACUTE PAIN

## 2024-05-08 NOTE — CARE PLAN
The patient is Stable - Low risk of patient condition declining or worsening    Shift Goals  Clinical Goals: PAIN CONTROL, MOBILIZE, bm  Patient Goals: DISCHARGE TO snf  Family Goals: n/a    Progress made toward(s) clinical / shift goals:    Problem: Knowledge Deficit - Standard  Goal: Patient and family/care givers will demonstrate understanding of plan of care, disease process/condition, diagnostic tests and medications  Outcome: Progressing   POC discussed-pt verbalized understanding.  Problem: Pain - Standard  Goal: Alleviation of pain or a reduction in pain to the patient’s comfort goal  Outcome: Progressing  Norco and flexeril given PRN with +results. Educated pt on importance of pain control- pt verbalized understanding.      Patient is not progressing towards the following goals:

## 2024-05-08 NOTE — PROGRESS NOTES
Virtual Nurse rounding complete.    Round Needs: Pain Rating 8 and Notified of Patient Needs: RN

## 2024-05-08 NOTE — PROGRESS NOTES
Went over discharge instructions w/ pt and daughter, when to call the doctor, f/u appointments, medications. Copy of discharge paperwork given to pt. Pt had no further questions, pt discharged to [facility] via WC. Report called to Anna BOLAND.

## 2024-05-08 NOTE — DISCHARGE PLANNING
Case Management Discharge Planning    Admission Date: 5/3/2024  GMLOS: 2.8  ALOS: 3    6-Clicks ADL Score: 17  6-Clicks Mobility Score: 18  PT and/or OT Eval ordered: Yes  Post-acute Referrals Ordered: Yes  Post-acute Choice Obtained: Yes  Has referral(s) been sent to post-acute provider:  Yes      Anticipated Discharge Dispo: Discharge Disposition: D/T to SNF with Medicare cert in anticipation of skilled care (03)  Discharge Address: [address] [address]  Discharge Contact Phone Number: [phone]    DME Needed: No    Action(s) Taken: SW made phone call to Shruti at [facility] to inquire about bed availability and transportation. Shruti confirmed that there is a bed available today and pt's transportation has been set up by their facility for 1200. Bedside RN informed SW that pt has not had BM since 5/5. Informed Shruti, who stated pt can still transfer and will need bowel protocol. Notified bedside RN.    UPDATE 0930  Spoke with pt at bedside to discuss transfer for [facility]. Pt and pt's daughter are agreeable and signed transfer form. Confirmed that transport will arrive around 1200. Pt expressed her concern with lack of BM, informed that bedside RN and provider are aware and will provide medications. [facility] provided pt with 2nd IMM form, faxed to [facility]. COBRA packet completed and notified bedside RN.    Escalations Completed: None    Medically Clear: No    Next Steps: Pt is set to DC to [facility] with w/c transport at 1200 today.    Barriers to Discharge: None    Is the patient up for discharge tomorrow: No, today.

## 2024-05-08 NOTE — DISCHARGE SUMMARY
Discharge Summary    CHIEF COMPLAINT ON ADMISSION  No chief complaint on file.      Reason for Admission  Rt humeral fx    Admission Date  5/3/2024     CODE STATUS  Full Code    HPI & HOSPITAL COURSE  83 female with past medical history of hypertension, hypothyroidism, psychiatric disorder, COPD on 2 L oxygen referred from [facility] with complicated fracture involving right humeral head for orthopedic evaluation. Orthopedic recommending nonoperative management with outpatient follow-up in 1 to 2 weeks with repeat x-ray.  Noted to be in acute COPD exacerbation and was started on DuoNeb and steroid treatment with improvement of symptoms.  PT/OT recommending postacute placement. Patient's pain controlled on regimen of hydrocodone, tylenol, lyrica. She has been accepted to SNF for ongoing therapies.    Therefore, she is discharged in fair and stable condition to skilled nursing facility.    The patient met 2-midnight criteria for an inpatient stay at the time of discharge.      FOLLOW UP ITEMS POST DISCHARGE  Take medications as prescribed.  Follow up with orthopedic surgery in 2 weeks.    DISCHARGE DIAGNOSES  Principal Problem:    Humeral head fracture, right, closed, initial encounter (POA: Unknown)  Active Problems:    Hypertension (POA: Yes)    Depression with anxiety (POA: Yes)    Hypothyroidism (POA: Yes)    Chronic obstructive pulmonary disease with acute exacerbation (HCC) (POA: Yes)    Tobacco abuse (POA: Yes)    ACP (advance care planning) (POA: Unknown)    Neuropathy (POA: Unknown)    Insomnia (POA: Unknown)    Diffuse pain in right upper extremity (POA: Unknown)    GERD (gastroesophageal reflux disease) (POA: Unknown)    Closed fracture of hip, right, initial encounter (HCC) (POA: Yes)    Leucocytosis (POA: Unknown)    Fracture (POA: Yes)  Resolved Problems:    Closed fracture of hip, right, initial encounter (HCC) (POA: Yes)      FOLLOW UP  No future appointments.  [facility]  [place] [id number] [facility] [address]  [phone]          MEDICATIONS ON DISCHARGE     Medication List        START taking these medications        Instructions   HYDROcodone/acetaminophen  MG Tabs  Commonly known as: Norco  Replaces: HYDROcodone-acetaminophen 5-325 MG Tabs per tablet   Take 1 Tablet by mouth every 8 hours as needed for Severe Pain for up to 7 days.  Dose: 1 Tablet            CONTINUE taking these medications        Instructions   citalopram 40 MG Tabs  Commonly known as: CeleXA   Take 40 mg by mouth every morning.  Dose: 40 mg     cyclobenzaprine 5 mg tablet  Commonly known as: Flexeril   Take 5 mg by mouth 2 (two) times a day.  Dose: 5 mg     estradiol 0.5 MG tablet  Commonly known as: Estrace   Take 0.25 mg by mouth every morning.  Dose: 0.25 mg     hydrOXYzine HCl 25 MG Tabs  Commonly known as: Atarax   Take 25 mg by mouth every morning.  Dose: 25 mg     levothyroxine 50 MCG Tabs  Commonly known as: Synthroid   Take 50 mcg by mouth every morning on an empty stomach.  Dose: 50 mcg     pregabalin 100 MG Caps  Commonly known as: Lyrica   Take 1 Capsule by mouth 2 times a day for 30 days.  Dose: 100 mg     Trelegy Ellipta 100-62.5-25 MCG/ACT inhaler  Generic drug: fluticasone-umeclidinium-vilanterol   Inhale 1 Puff every day.  Dose: 1 Puff     zolpidem 6.25 MG CR tablet  Commonly known as: Ambien CR   Take 1 Tablet by mouth at bedtime as needed for Sleep for up to 30 days.  Dose: 6.25 mg            STOP taking these medications      HYDROcodone-acetaminophen 5-325 MG Tabs per tablet  Commonly known as: Norco  Replaced by: HYDROcodone/acetaminophen  MG Tabs              Allergies  Allergies   Allergen Reactions    Ciprofloxacin Hives    Percocet [Apap-Fd&C Red #40 [place]-Oxycodone] Rash     Blisters    Prozac [Fluoxetine] Unspecified     .    Shellfish-Derived Products Unspecified     .    Sulfa Drugs Unspecified     .    Tape Unspecified     Paper tape ok    Ciprofloxacin  Hives    Flovent Hfa [Fluticasone] Anxiety    Percodan [Oxycodone-Aspirin]     Prozac     Shellfish Allergy     Sulfa Drugs     Tape      Taper tape ok       DIET  Orders Placed This Encounter   Procedures    Diet Order Diet: Regular     Standing Status:   Standing     Number of Occurrences:   1     Order Specific Question:   Diet:     Answer:   Regular [1]       ACTIVITY  As tolerated and directed by skilled nursing.  Weight bearing as tolerated    LINES, DRAINS, AND WOUNDS  This is an automated list. Peripheral IVs will be removed prior to discharge.                     MENTAL STATUS ON TRANSFER      Alert, oriented x4       CONSULTATIONS  Orthopedic surgery    PROCEDURES  none    LABORATORY  Lab Results   Component Value Date    SODIUM 140 05/03/2024    POTASSIUM 4.1 05/03/2024    CHLORIDE 103 05/03/2024    CO2 26 05/03/2024    GLUCOSE 90 05/03/2024    BUN 13 05/03/2024    CREATININE 0.55 05/03/2024    CREATININE 0.9 11/09/2005        Lab Results   Component Value Date    WBC 11.4 (H) 05/07/2024    HEMOGLOBIN 12.6 05/07/2024    HEMATOCRIT 40.4 05/07/2024    PLATELETCT 225 05/07/2024        Total time of the discharge process exceeds 32 minutes.

## 2024-05-08 NOTE — CARE PLAN
The patient is Stable - Low risk of patient condition declining or worsening    Shift Goals  Clinical Goals: pain control, ambulation  Patient Goals: discharge, pain control  Family Goals: n/a    Progress made toward(s) clinical / shift goals:    Problem: Knowledge Deficit - Standard  Goal: Patient and family/care givers will demonstrate understanding of plan of care, disease process/condition, diagnostic tests and medications  Outcome: Progressing     Problem: Knowledge Deficit - COPD  Goal: Patient/significant other demonstrates understanding of disease process, utilization of the Action Plan, medications and discharge instruction  Outcome: Progressing       Patient is not progressing towards the following goals: